# Patient Record
Sex: FEMALE | Race: WHITE | NOT HISPANIC OR LATINO | Employment: UNEMPLOYED | URBAN - NONMETROPOLITAN AREA
[De-identification: names, ages, dates, MRNs, and addresses within clinical notes are randomized per-mention and may not be internally consistent; named-entity substitution may affect disease eponyms.]

---

## 2017-03-06 ENCOUNTER — OFFICE VISIT (OUTPATIENT)
Dept: MEDICAL GROUP | Facility: PHYSICIAN GROUP | Age: 32
End: 2017-03-06
Payer: COMMERCIAL

## 2017-03-06 VITALS
OXYGEN SATURATION: 95 % | BODY MASS INDEX: 30.32 KG/M2 | HEIGHT: 65 IN | TEMPERATURE: 97.7 F | DIASTOLIC BLOOD PRESSURE: 74 MMHG | HEART RATE: 80 BPM | RESPIRATION RATE: 14 BRPM | WEIGHT: 182 LBS | SYSTOLIC BLOOD PRESSURE: 116 MMHG

## 2017-03-06 DIAGNOSIS — Z79.891 CHRONIC USE OF OPIATE DRUGS THERAPEUTIC PURPOSES: ICD-10-CM

## 2017-03-06 DIAGNOSIS — M25.551 RIGHT HIP PAIN: ICD-10-CM

## 2017-03-06 DIAGNOSIS — J02.9 SORE THROAT: ICD-10-CM

## 2017-03-06 DIAGNOSIS — F43.29 STRESS AND ADJUSTMENT REACTION: ICD-10-CM

## 2017-03-06 DIAGNOSIS — E66.9 OBESITY (BMI 35.0-39.9 WITHOUT COMORBIDITY): ICD-10-CM

## 2017-03-06 DIAGNOSIS — M25.559 ARTHRALGIA OF HIP, UNSPECIFIED LATERALITY: ICD-10-CM

## 2017-03-06 DIAGNOSIS — R06.83 SNORING: ICD-10-CM

## 2017-03-06 PROCEDURE — 99214 OFFICE O/P EST MOD 30 MIN: CPT | Performed by: NURSE PRACTITIONER

## 2017-03-06 RX ORDER — HYDROCODONE BITARTRATE AND ACETAMINOPHEN 7.5; 325 MG/1; MG/1
1 TABLET ORAL EVERY 6 HOURS
Qty: 90 TAB | Refills: 0 | Status: SHIPPED | OUTPATIENT
Start: 2017-03-06 | End: 2017-03-06 | Stop reason: SDUPTHER

## 2017-03-06 RX ORDER — HYDROCODONE BITARTRATE AND ACETAMINOPHEN 7.5; 325 MG/1; MG/1
1 TABLET ORAL EVERY 6 HOURS
Qty: 90 TAB | Refills: 0 | Status: SHIPPED | OUTPATIENT
Start: 2017-03-06 | End: 2017-05-30 | Stop reason: SDUPTHER

## 2017-03-06 ASSESSMENT — ENCOUNTER SYMPTOMS: DEPRESSION: 1

## 2017-03-06 ASSESSMENT — LIFESTYLE VARIABLES: HISTORY_ALCOHOL_USE: 0

## 2017-03-06 NOTE — MR AVS SNAPSHOT
"        Joyce Cintron   3/6/2017 4:40 PM   Office Visit   MRN: [id number]    Department:  [facility]   Dept Phone:  [phone]    Description:  Female : 1985   Provider:  ISMA Flor           Reason for Visit     Referral Needed sleep study / med refill      Allergies as of 3/6/2017     No Known Allergies      You were diagnosed with     Snoring   [485798]       Sore throat   [622027]       Arthralgia of hip, unspecified laterality   [863396]       Obesity (BMI 35.0-39.9 without comorbidity) ([facility])   [760376]       Right hip pain   [312052]       Chronic use of opiate drugs therapeutic purposes   [8164557]         Vital Signs     Blood Pressure Pulse Temperature Respirations Height Weight    116/74 mmHg 80 36.5 °C (97.7 °F) 14 1.651 m (5' 5\") 82.555 kg (182 lb)    Body Mass Index Oxygen Saturation Smoking Status             30.29 kg/m2 95% Never Smoker          Basic Information     Date Of Birth Sex Race Ethnicity Preferred Language    1985 Female White Non- English      Your appointments     Mar 24, 2017  4:00 PM   Established Patient with TOBIAS Spencer   [facility]    [facility] [facility] [address] [phone]           You will be receiving a confirmation call a few days before your appointment from our automated call confirmation system.              Problem List              ICD-10-CM Priority Class Noted - Resolved    Stress and adjustment reaction F43.29   3/18/2015 - Present    Vitamin D deficiency E55.9   3/18/2015 - Present    Hip pain M25.559   3/18/2015 - Present    Lazy eye H53.009   2015 - Present    Insomnia G47.00   2015 - Present    Hair loss L65.9   2015 - Present    Family history of MI (myocardial infarction) Z82.49   2016 - Present    Snoring R06.83   3/6/2017 - Present    Sore throat J02.9   3/6/2017 - Present    Obesity (BMI 35.0-39.9 without comorbidity) (HCC) E66.9   " 3/6/2017 - Present    Chronic use of opiate drugs therapeutic purposes Z79.899   3/6/2017 - Present      Health Maintenance        Date Due Completion Dates    IMM DTaP/Tdap/Td Vaccine (1 - Tdap) 1/3/2003 ---    PAP SMEAR 1/3/2005 ---    IMM INFLUENZA (1) 9/1/2016 ---            Current Immunizations     No immunizations on file.      Below and/or attached are the medications your provider expects you to take. Review all of your home medications and newly ordered medications with your provider and/or pharmacist. Follow medication instructions as directed by your provider and/or pharmacist. Please keep your medication list with you and share with your provider. Update the information when medications are discontinued, doses are changed, or new medications (including over-the-counter products) are added; and carry medication information at all times in the event of emergency situations     Allergies:  No Known Allergies          Medications  Valid as of: March 06, 2017 -  5:19 PM    Generic Name Brand Name Tablet Size Instructions for use    BusPIRone HCl (Tab) BUSPAR 15 MG Take 1 Tab by mouth 2 times a day.        Citalopram Hydrobromide (Tab) CELEXA 40 MG Take 1 Tab by mouth every day.        Hydrocodone-Acetaminophen (Tab) NORCO 7.5-325 MG Take 1 Tab by mouth every 6 hours.        Ibuprofen (Tab) MOTRIN 800 MG TAKE 1 TABLET BY MOUTH EVERY 8 HOURS        NON SPECIFIED   SACROILIAC BELT FOR WOMEN  DX: Chronic hip pain.        .                 Medicines prescribed today were sent to:     [facility] [id number] [place] [address] [address] [id number]  AT [facility] [address] [facility] [address]    Phone: [phone] Fax: [phone]    Open 24 Hours?: No    [facility] #[id number] - [place], [place] - [address]    [address] [place] [address]    Phone: [phone] Fax: [phone]    Open 24 Hours?: No    [facility] - [place] - [address]  AT PORTAL TO REGISTERED [facility] SITES    [address] [facility] [address]    Phone: [phone] Fax: [phone]    Open 24 Hours?: No      Medication refill instructions:       If your prescription bottle indicates you have medication refills left, it is not necessary to call your provider’s office. Please contact your pharmacy and they will refill your medication.    If your prescription bottle indicates you do not have any refills left, you may request refills at any time through one of the following ways: The online [employer] [facility] system (except Urgent Care), by calling your provider’s office, or by asking your pharmacy to contact your provider’s office with a refill request. Medication refills are processed only during regular business hours and may not be available until the next business day. Your provider may request additional information or to have a follow-up visit with you prior to refilling your medication.   *Please Note: Medication refills are assigned a new Rx number when refilled electronically. Your pharmacy may indicate that no refills were authorized even though a new prescription for the same medication is available at the pharmacy. Please request the medicine by name with the pharmacy before contacting your provider for a refill.        Instructions    To wean off Buspar--start taking once a day for 2 weeks, if ok take every other day for 2 weeks, then every 2 days for 2 weeks then stop    For Celexa, if feeling ok off the Buspar--start taking every other day for 2 weeks then every 2 days for 2 weeks then stop--If weaning straight from the 40s is too much, let me know and I will call in a script for 20 mg pill for weaning       Follow up with Cydney in 4 months    For pain management follow up, need to be seen last week of May       Other Notes About Your Plan     Urine drug screen need repeat to identify medication. She is a new start benzodiazepine present. No longer taking.           [facility]  Access Code: Activation code not generated  Current MyChart Status: Active

## 2017-03-07 NOTE — PATIENT INSTRUCTIONS
To wean off Buspar--start taking once a day for 2 weeks, if ok take every other day for 2 weeks, then every 2 days for 2 weeks then stop    For Celexa, if feeling ok off the Buspar--start taking every other day for 2 weeks then every 2 days for 2 weeks then stop--If weaning straight from the 40s is too much, let me know and I will call in a script for 20 mg pill for weaning       Follow up with Cydney in 4 months    For pain management follow up, need to be seen last week of May

## 2017-03-07 NOTE — ASSESSMENT & PLAN NOTE
"New onset, started this am. There are no other associated symptoms. Reports that she tends to get strep throat. Denies fever, but states she does not get fever when she gets strep throat. Nobody else in the household has strep. She feels that its \"moving\" down into her chest. Rapid strep test was done in office, this was negative. I did discuss with her that because her symptoms just started today, and lack of any other associated symptoms including significantly swollen tonsils, exudate, fever we will monitor for now if she is to worsen over the next few days she is to come back for further evaluation. I encouraged her to use Tylenol and/or Motrin for pain and/or fever. She is to increase her fluids and rest when she is able.  "

## 2017-03-07 NOTE — PROGRESS NOTES
"Chief Complaint   Patient presents with   • Referral Needed     sleep study / med refill         This is a 32 y.o.female patient that presents today with the following: Pain management and medication refill, multiple acute and chronic conditions, referral for sleep study    Snoring  Chronic in nature, at least 10 years. Reports that she excessive daytime sleepiness and her  tells her that she snores excessively and witness apneic episodes. She also says that she has woken herself up and feels like she is choking. She is requesting a sleep study, which I feel is completely reasonable. She can easily sit down on the couch and fall asleep. Sleep study has been ordered.     Sore throat  New onset, started this am. There are no other associated symptoms. Reports that she tends to get strep throat. Denies fever, but states she does not get fever when she gets strep throat. Nobody else in the household has strep. She feels that its \"moving\" down into her chest. Rapid strep test was done in office, this was negative. I did discuss with her that because her symptoms just started today, and lack of any other associated symptoms including significantly swollen tonsils, exudate, fever we will monitor for now if she is to worsen over the next few days she is to come back for further evaluation. I encouraged her to use Tylenol and/or Motrin for pain and/or fever. She is to increase her fluids and rest when she is able.    Hip pain  Chronic pain recheck: Pt here for pain management and medication refill. She has chronic hip and SI joint pain on the left side for which she takes hydrocodone 7.5-325 one pill 3-4 times a day, mostly 3 times a day though. Imaging is up-to-date. Opioid was told was done today, she scored a 2, thus she is appropriate for pain management and medication refills within primary care. We did go over risks, benefits and side effects of medication and she understands that these medications are addicting " and that there is no good evidence that this medication is beneficial for her pain. We did discuss her keeping her medications today and locked up if necessary. She understands that she is to get her medications only from myself for other providers in this practice and she is to use only one pharmacy. Her contract and UDS is up-to-date and they have been reviewed. [place]  has also been reviewed and she appears to be taking medications appropriately. She was also reminded that there will be no early refills and lost or stolen prescriptions will not be replaced.  Last dose of controlled substance: December 2016  Chronic pain treated with Hydrocodone 7.5-325 mg taken 3-4 times a day    She  reports that she does not drink alcohol.  She  reports that she does not use illicit drugs.    Consequences of Chronic Opiate therapy:  (5 A's)  Analgesia: Compared to no treatment or prior treatment, pain is currently improved  Activity: significantly improved  Adverse Events: She denies itchy skin  Aberrant Behaviors: She reports she is not taking medication as prescribed and is not veering from agreed treatment regimen. There have been no inappropriate refills or lost/stolen meds reported.   Affect/Mood: Pain is impacting patient's mood.  Patient denies depression/anxiety.    Nonnarcotic treatments that are being used: heating pain, ibuprofen, exerising, trying to lose weight.   Treatment goals discussed.    Last order of CONTROLLED SUBSTANCE TREATMENT AGREEMENT was found on 6/9/2016 from Office Visit on 6/9/2016     UDS Summary     Patient has no health maintenance due at this time        Most recent UDS reviewed today and is consistent with prescribed medications.    I have reviewed the medical records, the Prescription Monitoring Program and I have determined that controlled substance treatment is medically indicated.          Chronic use of opiate drugs therapeutic purposes  See additional notes    Stress and adjustment  reaction  This is a chronic condition, very well controlled. She tells me that she has discussed with her past provider weaning off of the BuSpar and citalopram and feels she is ready to do this. She feels that her anxiety and stress has resolved, in fact she feels that she was in remission from this about 6-9 months after medication was started about 2 years ago. She is ready to come off the medication now. I did discuss with her a weaning schedule for both of her medications--one medication at a time. She does deny suicidal and homicidal ideations, racing thoughts, and flight of ideas. I would like her to wean slowly off of one medication at a time and follow-up with her PCP in about 4 months. With regards to her citalopram, if she feels she cannot wean off of the 40 mg pills, she is to call and request 20 mg pills. Patient was agreeable to this plan.    Obesity (BMI 35.0-39.9 without comorbidity) (HCC)  Chronic in nature, uncontrolled. Patient's weight today is 182, BMI 30.29. We discussed increasing physical activity, making healthier food choices. She reports she will work on this and we will reassess at her next follow-up appointment. She understands the risks of obesity imposes on her health.      No visits with results within 1 Month(s) from this visit.  Latest known visit with results is:    Office Visit on 04/29/2016   Component Date Value   • Rapid Strep Screen 04/29/2016 positive    • Internal Control Positive 04/29/2016 Positive    • Internal Control Negative 04/29/2016 Negative          clinical course has been stable    Past Medical History   Diagnosis Date   • Disc disorder    • Anesthesia      PONV   • Back pain    • Pain 12/15/2015     left hip and buttock   • Depression        Past Surgical History   Procedure Laterality Date   • Dental extraction(s)  2003     wisdom teeth   • Hip arthroscopy Left 12/17/2015     Procedure: HIP ARTHROSCOPY bursectomy, gluteal eval, osteoplasty, labral debridement,  "chondroplasty, femoral neck decompression, IT band release;  Surgeon: Obdulio Frankel M.D.;  Location: SURGERY [facility];  Service:        Family History   Problem Relation Age of Onset   • Heart Disease Mother    • Heart Disease Father    • Heart Disease Paternal Grandmother    • Heart Disease Paternal Grandfather    • Diabetes     • Hypertension         Review of patient's allergies indicates no known allergies.    Current Outpatient Prescriptions Ordered in [facility]   Medication Sig Dispense Refill   • hydrocodone-acetaminophen (NORCO) 7.5-325 MG per tablet Take 1 Tab by mouth every 6 hours. 90 Tab 0   • busPIRone (BUSPAR) 15 MG tablet Take 1 Tab by mouth 2 times a day. 180 Tab 3   • citalopram (CELEXA) 40 MG Tab Take 1 Tab by mouth every day. 90 Tab 3   • NON SPECIFIED SACROILIAC BELT FOR WOMEN  DX: Chronic hip pain. 1 Each 0   • ibuprofen (MOTRIN) 800 MG Tab TAKE 1 TABLET BY MOUTH EVERY 8 HOURS 90 Tab 0     No current Epic-ordered facility-administered medications on file.       Constitutional ROS: No unexpected change in weight, No weakness, No unexplained fevers, sweats, or chills  Neck ROS: No lumps or masses, No swollen glands, No significant pain in neck  Pulmonary ROS: Positive for sore throat  Cardiovascular ROS: No chest pain, No edema, No palpitations  Gastrointestinal ROS: No abdominal pain, No nausea, vomiting, diarrhea, or constipation, no blood in stool  Musculoskeletal/Extremities ROS: Positive for chronic right hip pain, per history of present illness  Neurologic ROS: Normal development, No seizures, No weakness  Psychiatric ROS: Positive per history of present illness    Physical exam:  /74 mmHg  Pulse 80  Temp(Src) 36.5 °C (97.7 °F)  Resp 14  Ht 1.651 m (5' 5\")  Wt 82.555 kg (182 lb)  BMI 30.29 kg/m2  SpO2 95%  General Appearance: Young female, alert, no distress, mildly obese, well-groomed  Skin: Skin color, texture, turgor normal. No rashes or lesions.  Eyes: " conjunctivae/corneas clear. PERRL, OD--lazy eye  Ears: External ears normal. Canals clear. TM's normal.  Oropharynx: positive findings: mild oropharyngeal erythema  Neck: Neck supple. No adenopathy. Thyroid symmetric, normal size, and without nodularity  Lungs: negative findings: normal respiratory rate and rhythm, lungs clear to auscultation  Heart: negative. RRR without murmur, gallop, or rubs.  No ectopy.  Abdomen: Abdomen soft, non-tender. BS normal. No masses,  No organomegaly  Musculoskeletal: positive findings: Decreased range of motion to left hip due to pain  Neurologic: intact, oriented, mood appropriate, judgment intact. Cranial nerves II through XII grossly intact    Medical decision making/discussion: Patient here for pain management and medication refills. Lexiiminnie , UDS and contract reviewed, will be due for update in June 2017. She was reminded of parameters of contract as well as keeping her medications safe and locked up if necessary. 3 separate prescriptions were given to her to take to pharmacy as soon as possible. She was reminded that there will be no early refills and lost and or stolen prescriptions will not be replaced. She is to follow-up in 3 months, at least during the last week of May. We will start a weaning process for the BuSpar and Celexa per her request and she is to follow-up with her PCP in 4 months. I placed a referral for a sleep study. She is to follow-up in the next couple weeks if her sore throat does not improve with supportive care. She is to work on lifestyle modifications to improve her weight including healthy diet and increased physical activity.    Joyce was seen today for referral needed.    Diagnoses and all orders for this visit:    Snoring  -     REFERRAL TO SLEEP STUDIES    Sore throat    Arthralgia of hip, unspecified laterality    Obesity (BMI 35.0-39.9 without comorbidity) ([facility])  -     Patient identified as having weight management issue.  Appropriate  orders and counseling given.    Right hip pain  -     Discontinue: hydrocodone-acetaminophen (NORCO) 7.5-325 MG per tablet; Take 1 Tab by mouth every 6 hours.  -     Discontinue: hydrocodone-acetaminophen (NORCO) 7.5-325 MG per tablet; Take 1 Tab by mouth every 6 hours.  -     hydrocodone-acetaminophen (NORCO) 7.5-325 MG per tablet; Take 1 Tab by mouth every 6 hours.    Chronic use of opiate drugs therapeutic purposes    Stress and adjustment reaction          Please note that this dictation was created using voice recognition software. I have made every reasonable attempt to correct obvious errors, but I expect that there are errors of grammar and possibly content that I did not discover before finalizing the note.

## 2017-03-07 NOTE — ASSESSMENT & PLAN NOTE
This is a chronic condition, very well controlled. She tells me that she has discussed with her past provider weaning off of the BuSpar and citalopram and feels she is ready to do this. She feels that her anxiety and stress has resolved, in fact she feels that she was in remission from this about 6-9 months after medication was started about 2 years ago. She is ready to come off the medication now. I did discuss with her a weaning schedule for both of her medications--one medication at a time. She does deny suicidal and homicidal ideations, racing thoughts, and flight of ideas. I would like her to wean slowly off of one medication at a time and follow-up with her PCP in about 4 months. With regards to her citalopram, if she feels she cannot wean off of the 40 mg pills, she is to call and request 20 mg pills. Patient was agreeable to this plan.

## 2017-03-07 NOTE — ASSESSMENT & PLAN NOTE
Chronic in nature, at least 10 years. Reports that she excessive daytime sleepiness and her  tells her that she snores excessively and witness apneic episodes. She also says that she has woken herself up and feels like she is choking. She is requesting a sleep study, which I feel is completely reasonable. She can easily sit down on the couch and fall asleep. Sleep study has been ordered.

## 2017-03-07 NOTE — ASSESSMENT & PLAN NOTE
Chronic in nature, uncontrolled. Patient's weight today is 182, BMI 30.29. We discussed increasing physical activity, making healthier food choices. She reports she will work on this and we will reassess at her next follow-up appointment. She understands the risks of obesity imposes on her health.

## 2017-03-07 NOTE — ASSESSMENT & PLAN NOTE
Chronic pain recheck: Pt here for pain management and medication refill. She has chronic hip and SI joint pain on the left side for which she takes hydrocodone 7.5-325 one pill 3-4 times a day, mostly 3 times a day though. 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  Imaging is up-to-date. ORT was done today, she scored a 2, thus she is appropriate for pain management and medication refills within primary care. We did go over risks, benefits and side effects of medication and she understands that these medications are addicting and that there is no good evidence that this medication is beneficial for her pain. We did discuss her keeping her medications today and locked up if necessary. She understands that she is to get her medications only from myself for other providers in this practice and she is to use only one pharmacy. Her contract and UDS is up-to-date and they have been reviewed. [place]  has also been reviewed and she appears to be taking medications appropriately. She was also reminded that there will be no early refills and lost or stolen prescriptions will not be replaced.  Last dose of controlled substance: December 2016  Chronic pain treated with Hydrocodone 7.5-325 mg taken 3-4 times a day    She  reports that she does not drink alcohol.  She  reports that she does not use illicit drugs.    Consequences of Chronic Opiate therapy:  (5 A's)  Analgesia: Compared to no treatment or prior treatment, pain is currently improved  Activity: significantly improved  Adverse Events: She denies itchy skin  Aberrant Behaviors: She reports she is not taking medication as prescribed and is not veering from agreed treatment regimen. There have been no inappropriate refills or lost/stolen meds reported.   Affect/Mood: Pain is impacting patient's mood.  Patient denies depression/anxiety.    Nonnarcotic treatments that are being used: heating pain,  ibuprofen, exerising, trying to lose weight.   Treatment goals discussed.    Last order of CONTROLLED SUBSTANCE TREATMENT AGREEMENT was found on 6/9/2016 from Office Visit on 6/9/2016     UDS Summary     Patient has no health maintenance due at this time        Most recent UDS reviewed today and is consistent with prescribed medications.    I have reviewed the medical records, the Prescription Monitoring Program and I have determined that controlled substance treatment is medically indicated.

## 2017-05-14 ENCOUNTER — OFFICE VISIT (OUTPATIENT)
Dept: URGENT CARE | Facility: PHYSICIAN GROUP | Age: 32
End: 2017-05-14
Payer: COMMERCIAL

## 2017-05-14 VITALS
DIASTOLIC BLOOD PRESSURE: 78 MMHG | SYSTOLIC BLOOD PRESSURE: 118 MMHG | HEART RATE: 72 BPM | BODY MASS INDEX: 31.49 KG/M2 | HEIGHT: 65 IN | WEIGHT: 189 LBS | OXYGEN SATURATION: 99 % | TEMPERATURE: 98.6 F | RESPIRATION RATE: 14 BRPM

## 2017-05-14 DIAGNOSIS — J04.0 LARYNGITIS: ICD-10-CM

## 2017-05-14 DIAGNOSIS — J02.9 VIRAL PHARYNGITIS: ICD-10-CM

## 2017-05-14 LAB
INT CON NEG: NEGATIVE
INT CON POS: POSITIVE
S PYO AG THROAT QL: NORMAL

## 2017-05-14 PROCEDURE — 99214 OFFICE O/P EST MOD 30 MIN: CPT | Performed by: PHYSICIAN ASSISTANT

## 2017-05-14 PROCEDURE — 87880 STREP A ASSAY W/OPTIC: CPT | Performed by: PHYSICIAN ASSISTANT

## 2017-05-14 RX ORDER — DIPHENHYDRAMINE HYDROCHLORIDE AND LIDOCAINE HYDROCHLORIDE AND ALUMINUM HYDROXIDE AND MAGNESIUM HYDRO
5 KIT EVERY 6 HOURS PRN
Qty: 100 ML | Refills: 0 | Status: SHIPPED | OUTPATIENT
Start: 2017-05-14 | End: 2017-05-30

## 2017-05-14 NOTE — MR AVS SNAPSHOT
"        Joyce Cintron   2017 10:55 AM   Office Visit   MRN: [id number]    Department:  [facility]   Dept Phone:  [phone]    Description:  Female : 1985   Provider:  Yesica Pryor PA-C           Reason for Visit     Pharyngitis           Allergies as of 2017     No Known Allergies      You were diagnosed with     Sore throat   [451137]       Laryngitis   [397865]         Vital Signs     Blood Pressure Pulse Temperature Respirations Height Weight    118/78 mmHg 72 37 °C (98.6 °F) 14 1.651 m (5' 5\") 85.73 kg (189 lb)    Body Mass Index Oxygen Saturation Smoking Status             31.45 kg/m2 99% Never Smoker          Basic Information     Date Of Birth Sex Race Ethnicity Preferred Language    1985 Female White Non- English      Your appointments     May 23, 2017  3:00 PM   Established Patient with TOBIAS Spencer   [address] [address] [phone]           You will be receiving a confirmation call a few days before your appointment from our automated call confirmation system.            2017 10:40 AM   Telemedicine Clinic New Patient with A Rotation, [facility], [facility] [place]   Centralized Scheduling (--)    [address]  [place] [id number] [phone]            2017  1:20 PM   Established Patient with Lana Chase M.D.   [facility]    [address] [place] [address] [phone]           You will be receiving a confirmation call a few days before your appointment from our automated call confirmation system.              Problem List              ICD-10-CM Priority Class Noted - Resolved    Stress and adjustment reaction F43.29   3/18/2015 - Present    Vitamin D deficiency E55.9   3/18/2015 - Present    Hip pain M25.559   3/18/2015 - Present    Lazy eye H53.009   2015 - Present   " Insomnia G47.00   8/20/2015 - Present    Hair loss L65.9   8/20/2015 - Present    Family history of MI (myocardial infarction) Z82.49   9/23/2016 - Present    Snoring R06.83   3/6/2017 - Present    Sore throat J02.9   3/6/2017 - Present    Obesity (BMI 35.0-39.9 without comorbidity) (HCC) E66.9   3/6/2017 - Present    Chronic use of opiate drugs therapeutic purposes Z79.891   3/6/2017 - Present      Health Maintenance        Date Due Completion Dates    IMM DTaP/Tdap/Td Vaccine (1 - Tdap) 1/3/2003 ---    PAP SMEAR 1/3/2005 ---            Results     POCT Rapid Strep A      Component    Rapid Strep Screen    neg    Internal Control Positive    Positive    Internal Control Negative    Negative                        Current Immunizations     No immunizations on file.      Below and/or attached are the medications your provider expects you to take. Review all of your home medications and newly ordered medications with your provider and/or pharmacist. Follow medication instructions as directed by your provider and/or pharmacist. Please keep your medication list with you and share with your provider. Update the information when medications are discontinued, doses are changed, or new medications (including over-the-counter products) are added; and carry medication information at all times in the event of emergency situations     Allergies:  No Known Allergies          Medications  Valid as of: May 14, 2017 - 11:50 AM    Generic Name Brand Name Tablet Size Instructions for use    BusPIRone HCl (Tab) BUSPAR 15 MG Take 1 Tab by mouth 2 times a day.        Citalopram Hydrobromide (Tab) CELEXA 40 MG Take 1 Tab by mouth every day.        DPH-Lido-AlHydr-MgHydr-Simeth (Suspension) MAGIC MOUTHWASH BLM  Take 5 mL by mouth every 6 hours as needed.        Hydrocodone-Acetaminophen (Tab) NORCO 7.5-325 MG Take 1 Tab by mouth every 6 hours.        Ibuprofen (Tab) MOTRIN 800 MG TAKE 1 TABLET BY MOUTH EVERY 8 HOURS        .                  Medicines prescribed today were sent to:     [id number] - [place], [place] - [address] [facility] [address]    [address] [place] [address]    Phone: [phone] Fax: [phone]    Open 24 Hours?: No    [id number] - [place], [place] - [address]    [address] [place] [address]    Phone: [phone] Fax: [phone]    Open 24 Hours?: No    [facility] - [place], [place] - [address] AT PORTAL TO REGISTERED [facility] SITES    [address] [facility] [address]    Phone: [phone] Fax: [phone]    Open 24 Hours?: No      Medication refill instructions:       If your prescription bottle indicates you have medication refills left, it is not necessary to call your provider’s office. Please contact your pharmacy and they will refill your medication.    If your prescription bottle indicates you do not have any refills left, you may request refills at any time through one of the following ways: The online [employer] system (except Urgent Care), by calling your provider’s office, or by asking your pharmacy to contact your provider’s office with a refill request. Medication refills are processed only during regular business hours and may not be available until the next business day. Your provider may request additional information or to have a follow-up visit with you prior to refilling your medication.   *Please Note: Medication refills are assigned a new Rx number when refilled electronically. Your pharmacy may indicate that no refills were authorized even though a new prescription for the same medication is available at the pharmacy. Please request the medicine by name with the pharmacy before contacting your provider for a refill.        Other Notes About Your Plan     Urine drug screen need repeat to identify medication. She is a new start benzodiazepine present. No longer taking.           [employer] Access Code:  Activation code not generated  Current MyChart Status: Active

## 2017-05-15 NOTE — TELEPHONE ENCOUNTER
Was the patient seen in the last year in this department? Yes     Does patient have an active prescription for medications requested? No     Received Request Via: Pharmacy      Pt met protocol?: Yes pt last ov 9/2016

## 2017-05-15 NOTE — TELEPHONE ENCOUNTER
Cydney- You discussed with pt at last OV 3/6/2017 weaning off of this medication. Not exactly where pt is at on this schedule or if pt needs to be reduced to 20mg. Please advise.

## 2017-05-16 RX ORDER — CITALOPRAM 40 MG/1
TABLET ORAL
Qty: 90 TAB | Refills: 0 | Status: SHIPPED | OUTPATIENT
Start: 2017-05-16 | End: 2017-05-30

## 2017-05-16 NOTE — TELEPHONE ENCOUNTER
Please call patient. I refilled her RX, but wondered if she ever decided to wean off the medication?  Cydney Garcia

## 2017-05-18 ASSESSMENT — ENCOUNTER SYMPTOMS
SWOLLEN GLANDS: 0
SHORTNESS OF BREATH: 0
VOMITING: 0
FEVER: 0
NAUSEA: 0
DIARRHEA: 0
HEADACHES: 0
SORE THROAT: 1
DIZZINESS: 0
TROUBLE SWALLOWING: 1
CHILLS: 0
MUSCULOSKELETAL NEGATIVE: 1
ABDOMINAL PAIN: 0

## 2017-05-18 NOTE — PROGRESS NOTES
"Subjective:      Joyce Cintron is a 32 y.o. female who presents with Pharyngitis            Pharyngitis   This is a new problem. The current episode started in the past 7 days. The problem has been unchanged. Neither side of throat is experiencing more pain than the other. There has been no fever. The pain is at a severity of 3/10. Associated symptoms include congestion and trouble swallowing. Pertinent negatives include no abdominal pain, diarrhea, ear pain, headaches, shortness of breath, swollen glands or vomiting. She has had no exposure to strep or mono. She has tried nothing for the symptoms.       Review of Systems   Constitutional: Negative for fever and chills.   HENT: Positive for congestion, sore throat and trouble swallowing. Negative for ear pain.         Positive for loss of voice     Respiratory: Negative for shortness of breath.    Cardiovascular: Negative for chest pain.   Gastrointestinal: Negative for nausea, vomiting, abdominal pain and diarrhea.   Genitourinary: Negative.    Musculoskeletal: Negative.    Neurological: Negative for dizziness and headaches.          Objective:     /78 mmHg  Pulse 72  Temp(Src) 37 °C (98.6 °F)  Resp 14  Ht 1.651 m (5' 5\")  Wt 85.73 kg (189 lb)  BMI 31.45 kg/m2  SpO2 99%     Physical Exam   Constitutional: She is oriented to person, place, and time. She appears well-developed and well-nourished. No distress.   HENT:   Head: Normocephalic and atraumatic.   Right Ear: Hearing, tympanic membrane, external ear and ear canal normal.   Left Ear: Hearing, tympanic membrane, external ear and ear canal normal.   Nose: Nose normal.   Mouth/Throat: No oropharyngeal exudate.   Posterior oropharynx mildly erythematous without exudate or enlarged tonsils.    Eyes: Conjunctivae are normal. Pupils are equal, round, and reactive to light. Right eye exhibits no discharge. Left eye exhibits no discharge.   Neck: Normal range of motion.   Cardiovascular: Normal rate, " regular rhythm and normal heart sounds.  Exam reveals no friction rub.    No murmur heard.  Pulmonary/Chest: Effort normal. No respiratory distress. She has no wheezes. She has no rales.   Musculoskeletal: Normal range of motion.   Neurological: She is alert and oriented to person, place, and time.   Skin: Skin is warm and dry. She is not diaphoretic.   Psychiatric: She has a normal mood and affect. Her behavior is normal.   Nursing note and vitals reviewed.              PMH:  has a past medical history of Disc disorder; Anesthesia; Back pain; Pain (12/15/2015); and Depression.  MEDS:   Current outpatient prescriptions:   •  DPH-Lido-AlHydr-MgHydr-Simeth (MAGIC MOUTHWASH BLM) Suspension, Take 5 mL by mouth every 6 hours as needed., Disp: 100 mL, Rfl: 0  •  hydrocodone-acetaminophen (NORCO) 7.5-325 MG per tablet, Take 1 Tab by mouth every 6 hours., Disp: 90 Tab, Rfl: 0  •  citalopram (CELEXA) 40 MG Tab, TAKE 1 TABLET DAILY, Disp: 90 Tab, Rfl: 0  •  busPIRone (BUSPAR) 15 MG tablet, Take 1 Tab by mouth 2 times a day., Disp: 180 Tab, Rfl: 3  •  ibuprofen (MOTRIN) 800 MG Tab, TAKE 1 TABLET BY MOUTH EVERY 8 HOURS, Disp: 90 Tab, Rfl: 0  ALLERGIES: No Known Allergies  SURGHX:   Past Surgical History   Procedure Laterality Date   • Dental extraction(s)  2003     wisdom teeth   • Hip arthroscopy Left 12/17/2015     Procedure: HIP ARTHROSCOPY bursectomy, gluteal eval, osteoplasty, labral debridement, chondroplasty, femoral neck decompression, IT band release;  Surgeon: Obdulio Frankel M.D.;  Location: SURGERY [facility];  Service:      SOCHX:  reports that she has never smoked. She has never used smokeless tobacco. She reports that she does not drink alcohol or use illicit drugs.  FH: family history includes Diabetes in an other family member; Heart Disease in her father, mother, paternal grandfather, and paternal grandmother; Hypertension in an other family member.    Assessment/Plan:     1. Viral pharyngitis  -  POCT Rapid Strep A: NEGATIVE  - DPH-Lido-AlHydr-MgHydr-Simeth (MAGIC MOUTHWASH BLM) Suspension; Take 5 mL by mouth every 6 hours as needed.  Dispense: 100 mL; Refill: 0    2. Laryngitis  - Advised patient her symptoms are most likely viral in etiology, recommend supportive care  - Increased fluids and rest  - Encouraged use of humidifier, voice rest, and warm fluids    Call or return to office if symptoms persist or worsen. The patient demonstrated a good understanding and agreed with the treatment plan.

## 2017-05-30 ENCOUNTER — OFFICE VISIT (OUTPATIENT)
Dept: MEDICAL GROUP | Facility: PHYSICIAN GROUP | Age: 32
End: 2017-05-30
Payer: COMMERCIAL

## 2017-05-30 VITALS
HEART RATE: 94 BPM | HEIGHT: 65 IN | BODY MASS INDEX: 32.65 KG/M2 | RESPIRATION RATE: 16 BRPM | WEIGHT: 196 LBS | TEMPERATURE: 99.1 F | DIASTOLIC BLOOD PRESSURE: 76 MMHG | SYSTOLIC BLOOD PRESSURE: 118 MMHG | OXYGEN SATURATION: 96 %

## 2017-05-30 DIAGNOSIS — M25.551 RIGHT HIP PAIN: ICD-10-CM

## 2017-05-30 DIAGNOSIS — Z79.891 CHRONIC USE OF OPIATE DRUGS THERAPEUTIC PURPOSES: ICD-10-CM

## 2017-05-30 PROCEDURE — 99214 OFFICE O/P EST MOD 30 MIN: CPT | Performed by: NURSE PRACTITIONER

## 2017-05-30 RX ORDER — HYDROCODONE BITARTRATE AND ACETAMINOPHEN 7.5; 325 MG/1; MG/1
1 TABLET ORAL 3 TIMES DAILY PRN
Qty: 100 TAB | Refills: 0 | Status: SHIPPED | OUTPATIENT
Start: 2017-05-30 | End: 2017-08-30 | Stop reason: SDUPTHER

## 2017-05-30 RX ORDER — HYDROCODONE BITARTRATE AND ACETAMINOPHEN 7.5; 325 MG/1; MG/1
1 TABLET ORAL 3 TIMES DAILY PRN
Qty: 100 TAB | Refills: 0 | Status: SHIPPED | OUTPATIENT
Start: 2017-05-30 | End: 2017-05-30 | Stop reason: SDUPTHER

## 2017-05-30 NOTE — MR AVS SNAPSHOT
"        Joyce Arcostra   2017 5:00 PM   Office Visit   MRN: [id number]    Department:  [facility]   Dept Phone:  [phone]    Description:  Female : 1985   Provider:  ISMA Flor           Reason for Visit     Pain fv pain med refill      Allergies as of 2017     No Known Allergies      You were diagnosed with     Right hip pain   [526360]       Chronic use of opiate drugs therapeutic purposes   [1869795]         Vital Signs     Blood Pressure Pulse Temperature Respirations Height Weight    118/76 mmHg 94 37.3 °C (99.1 °F) 16 1.651 m (5' 5\") 88.905 kg (196 lb)    Body Mass Index Oxygen Saturation Smoking Status             32.62 kg/m2 96% Never Smoker          Basic Information     Date Of Birth Sex Race Ethnicity Preferred Language    1985 Female White Non- English      Your appointments     2017 10:40 AM   Telemedicine Clinic New Patient with A Rotation, [facility], [facility]   Centralized Scheduling (--)    [address] [facility] [id number] [phone]            2017  1:20 PM   Established Patient with Lana Chase M.D.   [facility] ([address] [address] [phone]           You will be receiving a confirmation call a few days before your appointment from our automated call confirmation system.              Problem List              ICD-10-CM Priority Class Noted - Resolved    Stress and adjustment reaction F43.29   3/18/2015 - Present    Vitamin D deficiency E55.9   3/18/2015 - Present    Hip pain M25.559   3/18/2015 - Present    Lazy eye H53.009   2015 - Present    Insomnia G47.00   2015 - Present    Hair loss L65.9   2015 - Present    Family history of MI (myocardial infarction) Z82.49   2016 - Present    Snoring R06.83   3/6/2017 - Present    Sore throat J02.9   3/6/2017 - Present    Obesity (BMI 35.0-39.9 " without comorbidity) ([facility]) E66.9   3/6/2017 - Present    Chronic use of opiate drugs therapeutic purposes Z79.891   3/6/2017 - Present      Health Maintenance        Date Due Completion Dates    IMM DTaP/Tdap/Td Vaccine (1 - Tdap) 1/3/2003 ---    PAP SMEAR 1/3/2005 ---            Current Immunizations     Tdap Vaccine 3/12/2009      Below and/or attached are the medications your provider expects you to take. Review all of your home medications and newly ordered medications with your provider and/or pharmacist. Follow medication instructions as directed by your provider and/or pharmacist. Please keep your medication list with you and share with your provider. Update the information when medications are discontinued, doses are changed, or new medications (including over-the-counter products) are added; and carry medication information at all times in the event of emergency situations     Allergies:  No Known Allergies          Medications  Valid as of: May 30, 2017 -  5:20 PM    Generic Name Brand Name Tablet Size Instructions for use    Hydrocodone-Acetaminophen (Tab) NORCO 7.5-325 MG Take 1 Tab by mouth 3 times a day as needed.        Ibuprofen (Tab) MOTRIN 800 MG TAKE 1 TABLET BY MOUTH EVERY 8 HOURS        .                 Medicines prescribed today were sent to:     [facility] [address] [place] [address] [address] [place] [id number]  AT [place] [address] [facility] [address]    Phone: [phone] Fax: [phone]    Open 24 Hours?: No    [id number] - [place], [place] - [address]    [address] [place] [address]    Phone: [phone] Fax: [phone]    Open 24 Hours?: No    [facility] [place] - [address] AT PORTAL TO REGISTERED [facility] SITES    [address] [facility] [address]    Phone: [phone] Fax: [phone]    Open 24 Hours?: No      Medication refill instructions:       If your prescription bottle indicates  you have medication refills left, it is not necessary to call your provider’s office. Please contact your pharmacy and they will refill your medication.    If your prescription bottle indicates you do not have any refills left, you may request refills at any time through one of the following ways: The online [employer] system (except Urgent Care), by calling your provider’s office, or by asking your pharmacy to contact your provider’s office with a refill request. Medication refills are processed only during regular business hours and may not be available until the next business day. Your provider may request additional information or to have a follow-up visit with you prior to refilling your medication.   *Please Note: Medication refills are assigned a new Rx number when refilled electronically. Your pharmacy may indicate that no refills were authorized even though a new prescription for the same medication is available at the pharmacy. Please request the medicine by name with the pharmacy before contacting your provider for a refill.        Instructions    Follow up the last week of August for refills           Other Notes About Your Plan     Urine drug screen need repeat to identify medication. She is a new start benzodiazepine present. No longer taking.           [employer] Access Code: Activation code not generated  Current [employer] Status: Active

## 2017-05-31 NOTE — ASSESSMENT & PLAN NOTE
Chronic pain recheck: here for pain management and pain medication refills. Take Norco 7.5-325 mg 1 pill up to 2-3 times a day for chronic hip pain. She does carry diagnosis of left hip femoro-acetabular impingement with cam-type bursitis and tight IT band. She will be traveling quite a bit this summer, taking several trips where she will be walking quite a bit, one trip will be overseas in [place]. Excessive walking does aggravate her pain and she is wondering if we can increase the dose for her trips. I was agreeable to increasing her dose just for the next 3 months while she travels with the expectation that she will decrease back down to her usual doses. Patient was agreeable to this plan.  I discussed with patient the risks, benefits and side effects of these medications. Patient was reminded that there would be no early refills and that lost and stolen prescriptions would not be replaced. I counseled patient on the fact that there is no clear evidence that ongoing opioid therapy is beneficial for chronic pain. Patient was instructed to keep pain medications safe and locked up if necessary. Patient was given 3 separate prescriptions for a 3 month supply to take to pharmacy right away. Patient is to take these exactly as prescribed and was advised not to drink alcohol or use illicit substances. The [place] , contract and recent UDS were reviewed and patient is taking medications appropriately.    Last dose of controlled substance: 2 days ago  Chronic pain treated with Norco 7.5-325 mg 1 pill  taken 2-3 times a day    She  reports that she does not drink alcohol.  She  reports that she does not use illicit drugs.    Consequences of Chronic Opiate therapy:  (5 A's)  Analgesia: Compared to no treatment or prior treatment, pain is currently improved  Activity: improved  Adverse Events: She denies constipation, dry mouth, itchy skin, nausea and sedation  Aberrant Behaviors: She reports she is taking medication as  prescribed and is not veering from agreed treatment regimen. There have been no inappropriate refills or lost/stolen meds reported.   Affect/Mood: Pain is not impacting patient's mood.  Patient denies depression/anxiety.    Nonnarcotic treatments that are being used: NSAIDs/HOLLINGSWORTH-2, stretching  Treatment goals discussed.    Opioid Risk Score: 2    Interpretation of Opioid Risk Score   Score 0-3 = Low risk of abuse. Do UDS at least once per year.  Score 4-7 = Moderate risk of abuse. Do UDS 1-4 times per year.  Score 8+ = High risk of abuse. Refer to specialist.    Last order of CONTROLLED SUBSTANCE TREATMENT AGREEMENT was found on 6/9/2016 from Office Visit on 6/9/2016     UDS Summary                URINE DRUG SCREEN Next Due 6/4/2017      Done 6/9/2016 URINE DRUG SCREEN        Most recent UDS reviewed today and is consistent with prescribed medications.    I have reviewed the medical records, the Prescription Monitoring Program and I have determined that controlled substance treatment is medically indicated.

## 2017-05-31 NOTE — PROGRESS NOTES
Chief Complaint   Patient presents with   • Pain     fv pain med refill         This is a 32 y.o.female patient that presents today with the following: Pain management follow-up and medication refills    Chronic use of opiate drugs therapeutic purposes  Chronic pain recheck: here for pain management and pain medication refills. Take Norco 7.5-325 mg 1 pill up to 2-3 times a day for chronic hip pain. She does carry diagnosis of left hip femoro-acetabular impingement with cam-type bursitis and tight IT band. She will be traveling quite a bit this summer, taking several trips where she will be walking quite a bit, one trip will be overseas in [place]. Excessive walking does aggravate her pain and she is wondering if we can increase the dose for her trips. I was agreeable to increasing her dose just for the next 3 months while she travels with the expectation that she will decrease back down to her usual doses. Patient was agreeable to this plan.  I discussed with patient the risks, benefits and side effects of these medications. Patient was reminded that there would be no early refills and that lost and stolen prescriptions would not be replaced. I counseled patient on the fact that there is no clear evidence that ongoing opioid therapy is beneficial for chronic pain. Patient was instructed to keep pain medications safe and locked up if necessary. Patient was given 3 separate prescriptions for a 3 month supply to take to pharmacy right away. Patient is to take these exactly as prescribed and was advised not to drink alcohol or use illicit substances. The [place] , contract and recent UDS were reviewed and patient is taking medications appropriately.    Last dose of controlled substance: 2 days ago  Chronic pain treated with Norco 7.5-325 mg 1 pill  taken 2-3 times a day    She  reports that she does not drink alcohol.  She  reports that she does not use illicit drugs.    Consequences of Chronic Opiate therapy:  (5  A's)  Analgesia: Compared to no treatment or prior treatment, pain is currently improved  Activity: improved  Adverse Events: She denies constipation, dry mouth, itchy skin, nausea and sedation  Aberrant Behaviors: She reports she is taking medication as prescribed and is not veering from agreed treatment regimen. There have been no inappropriate refills or lost/stolen meds reported.   Affect/Mood: Pain is not impacting patient's mood.  Patient denies depression/anxiety.    Nonnarcotic treatments that are being used: NSAIDs/HOLLINGSWORTH-2, stretching  Treatment goals discussed.    Opioid Risk Score: 2    Interpretation of Opioid Risk Score   Score 0-3 = Low risk of abuse. Do UDS at least once per year.  Score 4-7 = Moderate risk of abuse. Do UDS 1-4 times per year.  Score 8+ = High risk of abuse. Refer to specialist.    Last order of CONTROLLED SUBSTANCE TREATMENT AGREEMENT was found on 6/9/2016 from Office Visit on 6/9/2016     UDS Summary                URINE DRUG SCREEN Next Due 6/4/2017      Done 6/9/2016 URINE DRUG SCREEN        Most recent UDS reviewed today and is consistent with prescribed medications.    I have reviewed the medical records, the Prescription Monitoring Program and I have determined that controlled substance treatment is medically indicated.            Office Visit on 05/14/2017   Component Date Value   • Rapid Strep Screen 05/14/2017 neg    • Internal Control Positive 05/14/2017 Positive    • Internal Control Negative 05/14/2017 Negative          clinical course has been stable    Past Medical History   Diagnosis Date   • Disc disorder    • Anesthesia      PONV   • Back pain    • Pain 12/15/2015     left hip and buttock   • Depression        Past Surgical History   Procedure Laterality Date   • Dental extraction(s)  2003     wisdom teeth   • Hip arthroscopy Left 12/17/2015     Procedure: HIP ARTHROSCOPY bursectomy, gluteal eval, osteoplasty, labral debridement, chondroplasty, femoral neck  "decompression, IT band release;  Surgeon: Obdulio Frankel M.D.;  Location: SURGERY [facility];  Service:        Family History   Problem Relation Age of Onset   • Heart Disease Mother    • Heart Disease Father    • Heart Disease Paternal Grandmother    • Heart Disease Paternal Grandfather    • Diabetes     • Hypertension         Review of patient's allergies indicates no known allergies.    Current Outpatient Prescriptions Ordered in [facility]   Medication Sig Dispense Refill   • hydrocodone-acetaminophen (NORCO) 7.5-325 MG per tablet Take 1 Tab by mouth 3 times a day as needed. 100 Tab 0   • ibuprofen (MOTRIN) 800 MG Tab TAKE 1 TABLET BY MOUTH EVERY 8 HOURS 90 Tab 0     No current Epic-ordered facility-administered medications on file.       Constitutional ROS: No unexpected change in weight, No weakness, No unexplained fevers, sweats, or chills  Pulmonary ROS: No chronic cough, sputum, or hemoptysis, No shortness of breath, No recent change in breathing  Cardiovascular ROS: No chest pain, No edema, No palpitations  Gastrointestinal ROS: No abdominal pain, No nausea, vomiting, diarrhea, or constipation, no blood in stool  Musculoskeletal/Extremities ROS: Positive per history of present illness  Neurologic ROS: Normal development, No seizures, No weakness    Physical exam:  /76 mmHg  Pulse 94  Temp(Src) 37.3 °C (99.1 °F)  Resp 16  Ht 1.651 m (5' 5\")  Wt 88.905 kg (196 lb)  BMI 32.62 kg/m2  SpO2 96%  General Appearance: Young female, alert, no distress, obese, well-groomed  Skin: Skin color, texture, turgor normal. No rashes or lesions.  Lungs: negative findings: normal respiratory rate and rhythm, normal effort  Abdomen: Abdomen soft, non-tender. BS normal. No masses,  No organomegaly  Musculoskeletal: positive findings: Decreased range of motion to left hip due to pain  Neurologic: intact, oriented, mood appropriate, judgment intact. Cranial nerves II-12 grossly intact    Medical decision " making/discussion: Patient here for pain management medication refills. [place] , UDS and contract reviewed, she is taking medications appropriately. We will increase the number of pills for her next 3 prescriptions to #100, she can take 2-3 pills as needed. She was counseled on the risks benefits and side effects of medications, and reminded that there would be no early refills and that lost or stolen prescriptions would not be replaced. She is taking exactly as prescribed and keep him safe and locked up if necessary. She was advised not to drink alcohol or take any other illicit substances while on these medications. She is to follow-up in 3 months, at that time she will be due for a contract update as well as new UDS screening.    Joyce was seen today for pain.    Diagnoses and all orders for this visit:    Right hip pain    -     hydrocodone-acetaminophen (NORCO) 7.5-325 MG per tablet; Take 1 Tab by mouth 3 times a day as needed.    Chronic use of opiate drugs therapeutic purposes  -     hydrocodone-acetaminophen (NORCO) 7.5-325 MG per tablet; Take 1 Tab by mouth 3 times a day as needed.          Please note that this dictation was created using voice recognition software. I have made every reasonable attempt to correct obvious errors, but I expect that there are errors of grammar and possibly content that I did not discover before finalizing the note.

## 2017-06-12 ENCOUNTER — TELEMEDICINE2 (OUTPATIENT)
Dept: SCHEDULING | Facility: IMAGING CENTER | Age: 32
End: 2017-06-12

## 2017-06-12 VITALS
RESPIRATION RATE: 12 BRPM | WEIGHT: 189 LBS | SYSTOLIC BLOOD PRESSURE: 118 MMHG | HEIGHT: 66 IN | HEART RATE: 108 BPM | DIASTOLIC BLOOD PRESSURE: 76 MMHG | OXYGEN SATURATION: 96 % | TEMPERATURE: 97.7 F | BODY MASS INDEX: 30.37 KG/M2

## 2017-06-12 DIAGNOSIS — G47.33 OSA (OBSTRUCTIVE SLEEP APNEA): Primary | ICD-10-CM

## 2017-06-12 DIAGNOSIS — G47.00 INSOMNIA, UNSPECIFIED TYPE: ICD-10-CM

## 2017-06-12 DIAGNOSIS — E66.9 OBESITY (BMI 35.0-39.9 WITHOUT COMORBIDITY): ICD-10-CM

## 2017-06-12 PROCEDURE — 99201 PR OFFICE/OUTPT VISIT,NEW,LEVL I: CPT | Mod: GT | Performed by: INTERNAL MEDICINE

## 2017-06-12 ASSESSMENT — PAIN SCALES - GENERAL: PAINLEVEL: 2=MINIMAL-SLIGHT

## 2017-06-12 NOTE — PROGRESS NOTES
"Initial Office Visit    Referring Provider: Jeanne ARMSTRONG      Subjective:      Joyce Cintron is a 32 y.o. Female, hx of chronic pain, obesity, anxiety disorder who presents for complaints of insomnia.    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 She goes to bed around 10p, falls asleep about 11p-1230a, and at times sleeps through the night until 7am, and most of the time awakens around 3-4am and is unable to fall back asleep. Even when she  Sleeps until 7am, she feels tired, unrested, and requires about 3 cups of coffee, 1-2 coffee tablets during the course of the day. Her last cup is usually around 2pm. She exercises around 8pm. She has been on ambien in the past but no refills prescribed as she is on narcotics. She felt this would help her fall asleep but not restful. She snores, witnessed apneic events, choking events. Weight is stable at 180.   She denies fevers, chills, rashes. She has chronic hip pain, s/p surgical intervention. No nocturia.    Joyce  has a past medical history of Disc disorder; Anesthesia; Back pain; Pain (12/15/2015); and Depression.    Residential Hx-                                Pet Exposures Hx  Pet Exposures   • Denies Pet/Animal Exposures Yes        Occupational Hx-                                      Review of Systems  As per [facility]     Objective:     Filed Vitals:    06/12/17 1041   Height: 1.664 m (5' 5.5\")   Weight: 85.73 kg (189 lb)   Weight % change since last entry.: 0 %   BP: 118/76   Pulse: 108   BMI (Calculated): 30.97   Resp: 12   Temp: 36.5 °C (97.7 °F)   O2 sat % room air: 96 %       Physical Exam  This was a telemedicine visit         Assessment/Plan:   Insomnia  BABAK    Advised to eliminate caffeine. Workout earlier in the day.   elminate use of phones, TV, turn off lights. Use bedroom for intimacy and sleeping.  ESS of 15- moderate excessive daytime sleepiness  High risk for BABAK-- " Plan for polysomnography, split night  Avoid alcohol and other sedatives at bedtime  Encourage weight loss  Over 50% of the visit was used for counseling on sleep habits, sleep hygiene and concern for BABAK.  >RLS?-  Hgb, TSH WNL. Check iron studies      Jaime Milner MD  [facility]

## 2017-07-11 ENCOUNTER — OFFICE VISIT (OUTPATIENT)
Dept: MEDICAL GROUP | Facility: PHYSICIAN GROUP | Age: 32
End: 2017-07-11
Payer: COMMERCIAL

## 2017-07-11 ENCOUNTER — TELEPHONE (OUTPATIENT)
Dept: MEDICAL GROUP | Facility: PHYSICIAN GROUP | Age: 32
End: 2017-07-11

## 2017-07-11 VITALS
HEIGHT: 66 IN | WEIGHT: 178 LBS | DIASTOLIC BLOOD PRESSURE: 72 MMHG | HEART RATE: 74 BPM | RESPIRATION RATE: 16 BRPM | BODY MASS INDEX: 28.61 KG/M2 | OXYGEN SATURATION: 97 % | TEMPERATURE: 98.4 F | SYSTOLIC BLOOD PRESSURE: 118 MMHG

## 2017-07-11 DIAGNOSIS — R06.83 SNORING: ICD-10-CM

## 2017-07-11 DIAGNOSIS — E78.5 DYSLIPIDEMIA: ICD-10-CM

## 2017-07-11 DIAGNOSIS — M25.559 ARTHRALGIA OF HIP, UNSPECIFIED LATERALITY: ICD-10-CM

## 2017-07-11 PROCEDURE — 99214 OFFICE O/P EST MOD 30 MIN: CPT | Performed by: FAMILY MEDICINE

## 2017-07-11 NOTE — ASSESSMENT & PLAN NOTE
Chronic hip and low back pain on the left. Located above buttock on left side of spine with pain radiating down the side of her leg to above her knee and at times pain down the front of her leg.   No weakness, and after surgery the leg is not as strong as it was before. She has completed 3 rounds of PT.   She has had surgery for bursitis with Dr. Frankel with [facility] orthopedics. She was referred out to an SI joint specialist and has not gone yet because she feels a little hopeless about it and is tired of getting injections and surgeries.   She has tried gabapentin which did not help and made her hair fall out.   She controls the pain with ibuprofen 800 mg bid and norco 7.5/325 which she uses once or twice a day.  She would like to revisit the interventions on the hip and back after sleep study.   She does not use alcohol or illegal drugs or benzodiazepines.   She does not need any refills today.

## 2017-07-11 NOTE — MR AVS SNAPSHOT
"        Joyce Cintron   2017 1:20 PM   Office Visit   MRN: [id number]    Department:  [facility]   Dept Phone:  [phone]    Description:  Female : 1985   Provider:  Lana Chase M.D.           Reason for Visit     Medication Management celexa / buspar weaned off      Allergies as of 2017     No Known Allergies      You were diagnosed with     Arthralgia of hip, unspecified laterality   [867715]       Dyslipidemia   [818514]       Snoring   [830637]         Vital Signs     Blood Pressure Pulse Temperature Respirations Height Weight    118/72 mmHg 74 36.9 °C (98.4 °F) 16 1.664 m (5' 5.51\") 80.74 kg (178 lb)    Body Mass Index Oxygen Saturation Smoking Status             29.16 kg/m2 97% Never Smoker          Basic Information     Date Of Birth Sex Race Ethnicity Preferred Language    1985 Female White Non- English      Your appointments     Aug 21, 2017  9:05 PM   Sleep Study Diagnostic with SLEEP TECH   [facility] (--)    [address]  [place] [id number] [phone]            Aug 25, 2017 11:20 AM   Follow UP with ISMA Rojas   [facility] (--)    [address]  [place] [id number] [phone]            Aug 30, 2017  4:40 PM   Established Patient with TOBIAS Spencer   [address] [address] [phone]           You will be receiving a confirmation call a few days before your appointment from our automated call confirmation system.              Problem List              ICD-10-CM Priority Class Noted - Resolved    Stress and adjustment reaction F43.29   3/18/2015 - Present    Vitamin D deficiency E55.9   3/18/2015 - Present    Hip pain M25.559   3/18/2015 - Present    Lazy eye H53.009   2015 - Present    Insomnia G47.00   2015 - Present    Hair loss L65.9   2015 - Present   " Family history of MI (myocardial infarction) Z82.49   9/23/2016 - Present    Snoring R06.83   3/6/2017 - Present    Sore throat J02.9   3/6/2017 - Present    Obesity (BMI 35.0-39.9 without comorbidity) (HCC) E66.9   3/6/2017 - Present    Chronic use of opiate drugs therapeutic purposes Z79.891   3/6/2017 - Present    Dyslipidemia E78.5   7/11/2017 - Present      Health Maintenance        Date Due Completion Dates    PAP SMEAR 1/3/2005 ---    IMM INFLUENZA (1) 9/1/2017 ---    IMM DTaP/Tdap/Td Vaccine (2 - Td) 3/12/2019 3/12/2009            Current Immunizations     Tdap Vaccine 3/12/2009      Below and/or attached are the medications your provider expects you to take. Review all of your home medications and newly ordered medications with your provider and/or pharmacist. Follow medication instructions as directed by your provider and/or pharmacist. Please keep your medication list with you and share with your provider. Update the information when medications are discontinued, doses are changed, or new medications (including over-the-counter products) are added; and carry medication information at all times in the event of emergency situations     Allergies:  No Known Allergies          Medications  Valid as of: July 11, 2017 -  2:11 PM    Generic Name Brand Name Tablet Size Instructions for use    Hydrocodone-Acetaminophen (Tab) NORCO 7.5-325 MG Take 1 Tab by mouth 3 times a day as needed.        Ibuprofen (Tab) MOTRIN 800 MG TAKE 1 TABLET BY MOUTH EVERY 8 HOURS        .                 Medicines prescribed today were sent to:     [id number] [place] - [address]    [address] [facility] [address]    Phone: [phone] Fax: [phone]    Open 24 Hours?: No    [facility][id number] - [place] - [address]    [address] [place] [address]    Phone: [phone] Fax: [phone]    Open 24 Hours?: No    [facility] - [place], [place] - [address] AT PORTAL TO [facility]    [address] [facility] [address]    Phone: [phone] Fax: [phone]    Open 24 Hours?: No      Medication refill instructions:       If your prescription bottle indicates you have medication refills left, it is not necessary to call your provider’s office. Please contact your pharmacy and they will refill your medication.    If your prescription bottle indicates you do not have any refills left, you may request refills at any time through one of the following ways: The online [facility] system (except Urgent Care), by calling your provider’s office, or by asking your pharmacy to contact your provider’s office with a refill request. Medication refills are processed only during regular business hours and may not be available until the next business day. Your provider may request additional information or to have a follow-up visit with you prior to refilling your medication.   *Please Note: Medication refills are assigned a new Rx number when refilled electronically. Your pharmacy may indicate that no refills were authorized even though a new prescription for the same medication is available at the pharmacy. Please request the medicine by name with the pharmacy before contacting your provider for a refill.        Your To Do List     Future Labs/Procedures Complete By Expires    COMP METABOLIC PANEL  As directed 7/12/2018    LIPID PROFILE  As directed 7/12/2018      Other Notes About Your Plan     Urine drug screen need repeat to identify medication. She is a new start benzodiazepine present. No longer taking.           [facility] Access Code: Activation code not generated  Current [facility] Status: Active

## 2017-07-11 NOTE — TELEPHONE ENCOUNTER
Please call gurjit in [place] and provide authorization for patient to  up August prescription for norco on the 4th Friday as she is leaving out of town that weekend.   Lana Chase M.D.

## 2017-07-11 NOTE — ASSESSMENT & PLAN NOTE
She has daytime tiredness and snoring at sleep. She has an appointment for sleep study to be done end of August.

## 2017-07-14 NOTE — PROGRESS NOTES
"Subjective:   Joyce Cintron is a 32 y.o. female here today for evaluation and management of:     Hip pain  Chronic hip and low back pain on the left. Right above buttock on left side of spine with pain radiating down the side of her leg to above her knee and at times pain down the front of her leg.   No weakness, and after surgery the leg is not as strong as it was before. She has completed 3 round of PT.   She has had surgery for bursitis with Dr. Frankel with [facility]. She was referred out to an SI joint specialist and has not gone yet because she feels a little hopeless about it and is tired of getting injections and surgeries.   She has tried gabapentin which did not help and made her hair fall out.   She controls the pain with ibuprofen 800 mg bid and norco 7.5/325 which she uses once or twice a day.  She would like to revisit the interventions on the hip and back after sleep study.   She does not use alcohol or illegal drugs or benzodiazepines.   She does not need any refills today.     Snoring  She has daytime tiredness and snoring at sleep. She has an appointment for sleep study to be done end of August.              Current medicines (including changes today)  Current Outpatient Prescriptions   Medication Sig Dispense Refill   • hydrocodone-acetaminophen (NORCO) 7.5-325 MG per tablet Take 1 Tab by mouth 3 times a day as needed. 100 Tab 0   • ibuprofen (MOTRIN) 800 MG Tab TAKE 1 TABLET BY MOUTH EVERY 8 HOURS 90 Tab 0     No current facility-administered medications for this visit.     She  has a past medical history of Disc disorder; Anesthesia; Back pain; Pain (12/15/2015); and Depression.    ROS  No chest pain, no shortness of breath, no abdominal pain       Objective:     Blood pressure 118/72, pulse 74, temperature 36.9 °C (98.4 °F), resp. rate 16, height 1.664 m (5' 5.51\"), weight 80.74 kg (178 lb), SpO2 97 %. Body mass index is 29.16 kg/(m^2).   Physical Exam:  Constitutional: Alert, no " distress.  Skin: Warm, dry, good turgor, no rashes in visible areas.  Eye: Equal, round and reactive, conjunctiva clear, lids normal.  ENMT: Lips without lesions, good dentition, oropharynx clear.  Neck: Trachea midline, no masses, no thyromegaly. No cervical or supraclavicular lymphadenopathy  Respiratory: Unlabored respiratory effort, lungs clear to auscultation, no wheezes, no ronchi.  Cardiovascular: Normal S1, S2, no murmur, no edema.  Abdomen: Soft, non-tender, no masses, no hepatosplenomegaly.  Psych: Alert and oriented x3, normal affect and mood.        Assessment and Plan:   The following treatment plan was discussed    1. Arthralgia of hip, unspecified laterality  Patient advised to follow up with SI joint specialist as recommended by orthopedic specialist. She will do this after she has her sleep study  - COMP METABOLIC PANEL; Future    2. Dyslipidemia  Check labs  - LIPID PROFILE; Future    3. Snoring  Patient scheduled for sleep study      Followup: No Follow-up on file.

## 2017-07-21 NOTE — TELEPHONE ENCOUNTER
Was the patient seen in the last year in this department? Yes     Does patient have an active prescription for medications requested? No     Received Request Via: Pharmacy      Pt met protocol?: Yes pt last ov 7/17 medication was last d/c 5/30/17 rx completed

## 2017-07-21 NOTE — TELEPHONE ENCOUNTER
"Angela greco/mamta'd med 5/17 stating \"prescription completed\". Is this something you would like pt to continue?  "

## 2017-08-13 RX ORDER — BUSPIRONE HYDROCHLORIDE 15 MG/1
TABLET ORAL
Qty: 180 TAB | Refills: 0 | OUTPATIENT
Start: 2017-08-13

## 2017-08-21 ENCOUNTER — SLEEP STUDY (OUTPATIENT)
Dept: SLEEP MEDICINE | Facility: MEDICAL CENTER | Age: 32
End: 2017-08-21
Attending: INTERNAL MEDICINE
Payer: COMMERCIAL

## 2017-08-21 DIAGNOSIS — E66.9 OBESITY (BMI 35.0-39.9 WITHOUT COMORBIDITY): ICD-10-CM

## 2017-08-21 DIAGNOSIS — G47.00 INSOMNIA, UNSPECIFIED TYPE: ICD-10-CM

## 2017-08-21 DIAGNOSIS — G47.33 OSA (OBSTRUCTIVE SLEEP APNEA): ICD-10-CM

## 2017-08-21 PROCEDURE — 95810 POLYSOM 6/> YRS 4/> PARAM: CPT | Performed by: INTERNAL MEDICINE

## 2017-08-22 NOTE — PROCEDURES
Clinical Comments:  The patient underwent a comprehensive polysomnogram using the standard montage for measurement of parameters of sleep, respiratory events, movement abnormalities, heart rate and rhythm. A microphone was used to monitor snoring.        ANALYSIS:  The total recording time was 425.2 minutes with a sleep period of 417.2 minutes and the total sleep time was 261.5 minutes with a sleep efficiency of 61.5%.  The sleep latency was 8.0 minutes, and REM latency was 313.5 minutes.  The patient experienced 123 arousals in total, for an arousal index of 28.2     RESPIRATORY: The patient had 2 apneas in total.  Of these, 0 were obstructive apneas, and 2 were central apneas.  This resulted in an apnea index (AI) of 0.5.  The patient had 96 hypopneas, for a hypopnea index of 22.0.  The overall AHI was 22.5, while the AHI during Stage R sleep was 13.0.  AHI while supine was 26.7.     OXIMETRY: Oxygen saturation monitoring showed a mean SpO2 of 94.6%, with a minimum oxygen saturation of 92.0%.  Oxygen saturations were less than or = 89% for 0.0 minutes of sleep time.     CARDIAC: The highest heart rate during the recording was 119.0 beats per minute.  The average heart rate during sleep was 73.8 bpm.     LIMB MOVEMENTS: There were a total of 17 PLMs during sleep, of which 0 were PLMs arousals.  This resulted in a PLMS index of 3.9.      Interpretation:    Ms. Cintron presented with snoring, nocturnal apnea and excessive daytime somnolence.  This is a diagnostic study.    There is fragmentation of sleep with increased wake after sleep onset time totaling more than 2.5 hours and an elevated arousal and awakening index.  Significant REM sleep time is recorded.  There are occasional periodic limb movements but they do not affect sleep continuity.    The apnea hypopnea index is 22.5 events per hour, consisting almost exclusively of hypopnea episodes.  The lowest arterial oxygen saturation is 92% on room air.  The  heart rate varies from  bpm.    Assessment:  Moderate obstructive sleep apnea hypopnea with an apnea hypopnea index of 22.5 events per hour and a lowest arterial oxygen saturation of 92% on room air.  Fragmentation of sleep related to the breathing events and probably to laboratory effect.    Recommendations:  Airway pressurization, either guided by a titration polysomnogram or initiated through the use of auto titrating CPAP. Alternative treatments for sleep-disordered breathing include reconstructive otolaryngologic surgery, dental appliances and weight loss. If any these latter treatment options are selected, a follow-up polysomnogram is suggested to assess the efficacy of therapy. Behavioral measures including avoidance of sedatives, alcohol and supine body position may be of additional benefit.

## 2017-08-25 ENCOUNTER — SLEEP CENTER VISIT (OUTPATIENT)
Dept: SLEEP MEDICINE | Facility: MEDICAL CENTER | Age: 32
End: 2017-08-25
Payer: COMMERCIAL

## 2017-08-25 VITALS
OXYGEN SATURATION: 97 % | BODY MASS INDEX: 30.32 KG/M2 | HEIGHT: 65 IN | RESPIRATION RATE: 15 BRPM | DIASTOLIC BLOOD PRESSURE: 80 MMHG | WEIGHT: 182 LBS | HEART RATE: 99 BPM | SYSTOLIC BLOOD PRESSURE: 130 MMHG

## 2017-08-25 DIAGNOSIS — Z79.891 CHRONIC USE OF OPIATE DRUGS THERAPEUTIC PURPOSES: ICD-10-CM

## 2017-08-25 DIAGNOSIS — G47.33 OSA (OBSTRUCTIVE SLEEP APNEA): ICD-10-CM

## 2017-08-25 DIAGNOSIS — G47.00 INSOMNIA, UNSPECIFIED TYPE: ICD-10-CM

## 2017-08-25 DIAGNOSIS — E66.9 OBESITY (BMI 35.0-39.9 WITHOUT COMORBIDITY): ICD-10-CM

## 2017-08-25 PROCEDURE — 99213 OFFICE O/P EST LOW 20 MIN: CPT | Performed by: NURSE PRACTITIONER

## 2017-08-25 NOTE — PROGRESS NOTES
"Chief Complaint   Patient presents with   • Results     PSG         HPI: This patient is a 32 y.o. female, who presents for sleep study results. Medical history includes obesity, chronic hip pain, anxiety disorder.    Sleep symptoms include frequent nocturnal awakenings, chronic insomnia, Significant daytime hypersomnolence, snoring, witnessed apneas, resuscitative gasps. PSG indicates moderate sleep apnea with an AHI of 22.5, minimum oxygen saturation of 92 %. Her events were primarily hypopneas. Testing reviewed in detail with the patient. She did not meet criteria for split-night study. In lab Titration versus trial of auto CPAP was discussed.       Past Medical History   Diagnosis Date   • Disc disorder    • Anesthesia      PONV   • Back pain    • Pain 12/15/2015     left hip and buttock   • Depression        Social History   Substance Use Topics   • Smoking status: Never Smoker    • Smokeless tobacco: Never Used   • Alcohol Use: 0.0 oz/week     0 Standard drinks or equivalent per week      Comment: 1-2 [per month       Family History   Problem Relation Age of Onset   • Heart Disease Mother    • Heart Disease Father    • Heart Disease Paternal Grandmother    • Heart Disease Paternal Grandfather    • Diabetes     • Hypertension         Current medications as of today   Current Outpatient Prescriptions   Medication Sig Dispense Refill   • hydrocodone-acetaminophen (NORCO) 7.5-325 MG per tablet Take 1 Tab by mouth 3 times a day as needed. 100 Tab 0   • ibuprofen (MOTRIN) 800 MG Tab TAKE 1 TABLET BY MOUTH EVERY 8 HOURS 90 Tab 0     No current facility-administered medications for this visit.       Allergies: Review of patient's allergies indicates no known allergies.    Blood pressure 130/80, pulse 99, resp. rate 15, height 1.651 m (5' 5\"), weight 82.555 kg (182 lb), SpO2 97 %.      ROS:   Constitutional: Denies fevers, chills, night sweats, weight lossPositive fatigue.  Eyes: Denies pain, discharge/drainage  ENT: " Denies tinnitus, hearing loss, sinusitis, hoarseness, epistaxis  Allergic: Denies Allergic rhinitis or hayfever  Respiratory: Denies cough, wheeze, dyspnea, hemoptysis  Cardiovascular: Denies chest pain, tightness, palpitations, orthopnea or edema  Sleep: See HPI  GI/: Denies heartburn, nausea, vomiting, urinary incontinence, hematuria  Musculoskeletal: Chronic hip pain  Neurological: Denies vertigo or headaches  Skin: Denies rashes, lesions  Psychiatric: Denies depression or anxiety    Physical exam:   Constitutional: Well-nourished, well-developed, in no acute distress  Neck: supple, no masses  Respiratory: no intercostal retractions or accessory muscle use   Lungs auscultation: Clear to auscultation bilaterally  Cardiovascular: Regular rate rhythm no murmurs, rubs or gallops  Musculoskeletal: Normal gait, no clubbing or cyanosis  Skin: No rashes or lesions  Neuro: No focal deficit, cranial nerves grossly intact  Psychiatric: Oriented to time, person and place.     Diagnosis:  1. Obesity (BMI 35.0-39.9 without comorbidity) (HCC)     2. Chronic use of opiate drugs therapeutic purposes     3. BABAK (obstructive sleep apnea)  POLYSOMNOGRAPHY TITRATION    DME CPAP   4. Insomnia, unspecified type         Plan:  Patient has significant symptoms which greatly affect her daily life. She is eager to initiate therapy. She understands she may ultimately require a titration. She would like to start auto CPAP for now. She does use narcotics for pain control but has not used in the last week and a half. She is cutting back and uses Norco infrequently. She did not have evidence of significant central apnea on PSG.    1. Trial of auto CPAP 5-15 cm H2O  2. Schedule titration study today  3. Follow-up in approximately 8-10 weeks before titration study to review compliance chip. If symptoms resolve and compliance looks good, cancel titration

## 2017-08-25 NOTE — PATIENT INSTRUCTIONS
1. Initiate auto CPAP order to [facility], you should hear from them in approx 7-10 days, if you do not, please call our office  2. Schedule titration study today  3. Follow-up in approximately 8-10 weeks before titration study to review compliance chip. If symptoms resolve and compliance looks good, cancel titration

## 2017-08-25 NOTE — MR AVS SNAPSHOT
"        Joyce Arcostra   2017 11:20 AM   Sleep Center Visit   MRN: [id number]    Department:  [facility]   Dept Phone:  [phone]    Description:  Female : 1985   Provider:  ISMA Rojas           Reason for Visit     Results PSG      Allergies as of 2017     No Known Allergies      You were diagnosed with     Obesity (BMI 35.0-39.9 without comorbidity) (HCC)   [183985]       Chronic use of opiate drugs therapeutic purposes   [5439593]       BABAK (obstructive sleep apnea)   [590584]       Insomnia, unspecified type   [4464876]         Vital Signs     Blood Pressure Pulse Respirations Height Weight Body Mass Index    130/80 mmHg 99 15 1.651 m (5' 5\") 82.555 kg (182 lb) 30.29 kg/m2    Oxygen Saturation Smoking Status                97% Never Smoker           Basic Information     Date Of Birth Sex Race Ethnicity Preferred Language    1985 Female White Non- English      Your appointments     Aug 30, 2017  4:40 PM   Established Patient with TOBIAS Spencer   [facility]    [address] [place] [id number] [phone]           You will be receiving a confirmation call a few days before your appointment from our automated call confirmation system.            Oct 27, 2017  7:10 PM   Sleep Study with SLEEP TECH   [facility] (--)    [email or web address]  [facility] [address]  [place] [id number] [phone]            2017  8:40 AM   Follow UP with ISMA Rojas   [facility] (--)    [email or web address]  [facility] [address]  [place] [address]   [phone]              Problem List              ICD-10-CM Priority Class Noted - Resolved    Stress and adjustment reaction F43.29   3/18/2015 - Present    Vitamin D deficiency E55.9   3/18/2015 - Present    Hip pain M25.559   3/18/2015 - Present    Lazy eye H53.009   2015 - Present    Insomnia G47.00   2015 - " Present    Hair loss L65.9   8/20/2015 - Present    Family history of MI (myocardial infarction) Z82.49   9/23/2016 - Present    Snoring R06.83   3/6/2017 - Present    Sore throat J02.9   3/6/2017 - Present    Obesity (BMI 35.0-39.9 without comorbidity) (HCC) E66.9   3/6/2017 - Present    Chronic use of opiate drugs therapeutic purposes Z79.891   3/6/2017 - Present    Dyslipidemia E78.5   7/11/2017 - Present    BABAK (obstructive sleep apnea) G47.33   8/25/2017 - Present      Health Maintenance        Date Due Completion Dates    PAP SMEAR 1/3/2005 ---    IMM INFLUENZA (1) 9/1/2017 ---    IMM DTaP/Tdap/Td Vaccine (2 - Td) 3/12/2019 3/12/2009            Current Immunizations     Tdap Vaccine 3/12/2009      Below and/or attached are the medications your provider expects you to take. Review all of your home medications and newly ordered medications with your provider and/or pharmacist. Follow medication instructions as directed by your provider and/or pharmacist. Please keep your medication list with you and share with your provider. Update the information when medications are discontinued, doses are changed, or new medications (including over-the-counter products) are added; and carry medication information at all times in the event of emergency situations     Allergies:  No Known Allergies          Medications  Valid as of: August 25, 2017 - 12:02 PM    Generic Name Brand Name Tablet Size Instructions for use    Hydrocodone-Acetaminophen (Tab) NORCO 7.5-325 MG Take 1 Tab by mouth 3 times a day as needed.        Ibuprofen (Tab) MOTRIN 800 MG TAKE 1 TABLET BY MOUTH EVERY 8 HOURS        .                 Medicines prescribed today were sent to:     [id number] - [place], [place] - [address]    [address] [facility] [address]    Phone: [phone] Fax: [phone]    Open 24 Hours?: No    [id number] - [place], [place] - [address]    [address] [place] [address]    Phone: [phone] Fax: [phone]    Open 24 Hours?: No    [facility] - [place], [place] - [address] AT PORTAL TO REGISTERED [facility] SIT[address]    [address] [facility] [address]    Phone: [phone] Fax: [phone]    Open 24 Hours?: No      Medication refill instructions:       If your prescription bottle indicates you have medication refills left, it is not necessary to call your provider’s office. Please contact your pharmacy and they will refill your medication.    If your prescription bottle indicates you do not have any refills left, you may request refills at any time through one of the following ways: The online [employer] system (except Urgent Care), by calling your provider’s office, or by asking your pharmacy to contact your provider’s office with a refill request. Medication refills are processed only during regular business hours and may not be available until the next business day. Your provider may request additional information or to have a follow-up visit with you prior to refilling your medication.   *Please Note: Medication refills are assigned a new Rx number when refilled electronically. Your pharmacy may indicate that no refills were authorized even though a new prescription for the same medication is available at the pharmacy. Please request the medicine by name with the pharmacy before contacting your provider for a refill.        Your To Do List     Future Labs/Procedures Complete By Expires    POLYSOMNOGRAPHY TITRATION  As directed 8/25/2018      Instructions    1. Initiate auto CPAP order to [facility], you should hear from them in approx 7-10 days, if you do not, please call our office  2. Schedule titration study today  3. Follow-up in approximately 8-10 weeks before titration study to review compliance chip. If symptoms resolve and compliance looks good, cancel titration       Other Notes About Your Plan     Urine drug screen  need repeat to identify medication. She is a new start benzodiazepine present. No longer taking.           [email or web address] Access Code: Activation code not generated  Current Flexuspine Status: Active

## 2017-08-27 DIAGNOSIS — F32.9 REACTIVE DEPRESSION: ICD-10-CM

## 2017-08-27 DIAGNOSIS — F41.9 ANXIETY: ICD-10-CM

## 2017-08-27 DIAGNOSIS — F32.A DEPRESSION: ICD-10-CM

## 2017-08-29 RX ORDER — CITALOPRAM 40 MG/1
TABLET ORAL
Qty: 90 TAB | Refills: 0 | Status: SHIPPED | OUTPATIENT
Start: 2017-08-29 | End: 2017-08-30

## 2017-08-29 NOTE — TELEPHONE ENCOUNTER
Was the patient seen in the last year in this department? Yes     Does patient have an active prescription for medications requested? No     Received Request Via: Pharmacy      Pt met protocol?: Yes, labs 9/15 ov 7/17

## 2017-08-30 ENCOUNTER — OFFICE VISIT (OUTPATIENT)
Dept: MEDICAL GROUP | Facility: PHYSICIAN GROUP | Age: 32
End: 2017-08-30
Payer: COMMERCIAL

## 2017-08-30 VITALS
BODY MASS INDEX: 30.05 KG/M2 | HEIGHT: 66 IN | HEART RATE: 128 BPM | OXYGEN SATURATION: 96 % | TEMPERATURE: 98.5 F | RESPIRATION RATE: 20 BRPM | SYSTOLIC BLOOD PRESSURE: 130 MMHG | DIASTOLIC BLOOD PRESSURE: 78 MMHG | WEIGHT: 187 LBS

## 2017-08-30 DIAGNOSIS — M25.551 RIGHT HIP PAIN: ICD-10-CM

## 2017-08-30 DIAGNOSIS — Z79.891 CHRONIC USE OF OPIATE DRUGS THERAPEUTIC PURPOSES: ICD-10-CM

## 2017-08-30 DIAGNOSIS — G47.33 OSA (OBSTRUCTIVE SLEEP APNEA): ICD-10-CM

## 2017-08-30 PROCEDURE — 99214 OFFICE O/P EST MOD 30 MIN: CPT | Performed by: NURSE PRACTITIONER

## 2017-08-30 RX ORDER — HYDROCODONE BITARTRATE AND ACETAMINOPHEN 7.5; 325 MG/1; MG/1
1 TABLET ORAL 3 TIMES DAILY PRN
Qty: 90 TAB | Refills: 0 | Status: SHIPPED | OUTPATIENT
Start: 2017-08-30 | End: 2017-08-30 | Stop reason: SDUPTHER

## 2017-08-30 RX ORDER — HYDROCODONE BITARTRATE AND ACETAMINOPHEN 7.5; 325 MG/1; MG/1
1 TABLET ORAL 3 TIMES DAILY PRN
Qty: 90 TAB | Refills: 0 | Status: SHIPPED | OUTPATIENT
Start: 2017-08-30 | End: 2017-11-29 | Stop reason: SDUPTHER

## 2017-08-30 ASSESSMENT — PATIENT HEALTH QUESTIONNAIRE - PHQ9: CLINICAL INTERPRETATION OF PHQ2 SCORE: 0

## 2017-08-30 NOTE — ASSESSMENT & PLAN NOTE
Chronic pain recheck: pt here for pain management and medication refill. She takes Norco 7.5-325 mg 1 pill up to 3 times per day forleft hip femoro-acetabular impingement with cam-type bursitis and tight IT band. She has recently traveled quite a bit over the last 3 months and her dose was temporarily increased. But she is ready to go back to previous dose of 1 pill up to 3 times per day.  I discussed with patient the risks, benefits and side effects of these medications. Patient was reminded that there would be no early refills and that lost and stolen prescriptions would not be replaced. I counseled patient on the fact that there is no clear evidence that ongoing opioid therapy is beneficial for chronic pain. Patient was instructed to keep pain medications safe and locked up if necessary. Patient was given 3 separate prescriptions for a 3 month supply to take to pharmacy right away. Patient is to take these exactly as prescribed and was advised not to drink alcohol or use illicit substances. The [place] , contract and recent UDS were reviewed and patient is taking medications appropriately.     Last dose of controlled substance: a couple of days  Chronic pain treated with Norco 7.5-325 mg 1 pill taken 3 times a day    She  reports that she drinks alcohol.  She  reports that she does not use drugs.    Consequences of Chronic Opiate therapy:  (5 A's)  Analgesia: Compared to no treatment or prior treatment, pain is currently improved  Activity: improved  Adverse Events: She denies constipation, dry mouth, itchy skin, nausea and sedation  Aberrant Behaviors: She reports she is taking medication as prescribed and is not veering from agreed treatment regimen. There have been no inappropriate refills or lost/stolen meds reported.   Affect/Mood: Pain is not impacting patient's mood.  Patient denies depression/anxiety.    Nonnarcotic treatments that are being used: NSAIDs/HOLLINGSWORTH-2.   Treatment goals discussed.    Opioid  Risk Score: 2    Interpretation of Opioid Risk Score   Score 0-3 = Low risk of abuse. Do UDS at least once per year.  Score 4-7 = Moderate risk of abuse. Do UDS 1-4 times per year.  Score 8+ = High risk of abuse. Refer to specialist.    Last order of CONTROLLED SUBSTANCE TREATMENT AGREEMENT was found on 6/9/2016 from Office Visit on 6/9/2016     UDS Summary                URINE DRUG SCREEN Overdue 6/4/2017      Done 6/9/2016 URINE DRUG SCREEN        Most recent UDS reviewed today and is consistent with prescribed medications.    I have reviewed the medical records, the Prescription Monitoring Program and I have determined that controlled substance treatment is medically indicated.

## 2017-08-30 NOTE — PROGRESS NOTES
Chief Complaint   Patient presents with   • Medication Refill     pain med         This is a 32 y.o.female patient that presents today with the following:Pain management follow-up and medication refills    Chronic use of opiate drugs therapeutic purposes  Chronic pain recheck: pt here for pain management and medication refill. She takes Norco 7.5-325 mg 1 pill up to 3 times per day forleft hip femoro-acetabular impingement with cam-type bursitis and tight IT band. She has recently traveled quite a bit over the last 3 months and her dose was temporarily increased. But she is ready to go back to previous dose of 1 pill up to 3 times per day.  I discussed with patient the risks, benefits and side effects of these medications. Patient was reminded that there would be no early refills and that lost and stolen prescriptions would not be replaced. I counseled patient on the fact that there is no clear evidence that ongoing opioid therapy is beneficial for chronic pain. Patient was instructed to keep pain medications safe and locked up if necessary. Patient was given 3 separate prescriptions for a 3 month supply to take to pharmacy right away. Patient is to take these exactly as prescribed and was advised not to drink alcohol or use illicit substances. The [place] , contract and recent UDS were reviewed and patient is taking medications appropriately. She is due for renewal of controlled substance agreement and UDS. This was done today.     Last dose of controlled substance: a couple of days  Chronic pain treated with Norco 7.5-325 mg 1 pill taken 3 times a day    She  reports that she drinks alcohol.  She  reports that she does not use drugs.    Consequences of Chronic Opiate therapy:  (5 A's)  Analgesia: Compared to no treatment or prior treatment, pain is currently improved  Activity: improved  Adverse Events: She denies constipation, dry mouth, itchy skin, nausea and sedation  Aberrant Behaviors: She reports she is  taking medication as prescribed and is not veering from agreed treatment regimen. There have been no inappropriate refills or lost/stolen meds reported.   Affect/Mood: Pain is not impacting patient's mood.  Patient denies depression/anxiety.    Nonnarcotic treatments that are being used: NSAIDs/HOLLINGSWORTH-2.   Treatment goals discussed.    Opioid Risk Score: 2    Interpretation of Opioid Risk Score   Score 0-3 = Low risk of abuse. Do UDS at least once per year.  Score 4-7 = Moderate risk of abuse. Do UDS 1-4 times per year.  Score 8+ = High risk of abuse. Refer to specialist.    Last order of CONTROLLED SUBSTANCE TREATMENT AGREEMENT was found on 6/9/2016 from Office Visit on 6/9/2016     UDS Summary                URINE DRUG SCREEN Overdue 6/4/2017      Done 6/9/2016 URINE DRUG SCREEN        Most recent UDS reviewed today and is consistent with prescribed medications.    I have reviewed the medical records, the Prescription Monitoring Program and I have determined that controlled substance treatment is medically indicated.        BABAK (obstructive sleep apnea)  Patient has recently been diagnosed with obstructive sleep apnea and is waiting for equipment from [facility] (CPAP).      No visits with results within 1 Month(s) from this visit.   Latest known visit with results is:   Office Visit on 05/14/2017   Component Date Value   • Rapid Strep Screen 05/14/2017 neg    • Internal Control Positive 05/14/2017 Positive    • Internal Control Negative 05/14/2017 Negative          clinical course has been stable    Past Medical History:   Diagnosis Date   • Pain 12/15/2015    left hip and buttock   • Anesthesia     PONV   • Back pain    • Depression    • Disc disorder        Past Surgical History:   Procedure Laterality Date   • HIP ARTHROSCOPY Left 12/17/2015    Procedure: HIP ARTHROSCOPY bursectomy, gluteal eval, osteoplasty, labral debridement, chondroplasty, femoral neck decompression, IT band release;  Surgeon: Obdulio Frankel,  "M.D.;  Location: SURGERY [facility];  Service:    • DENTAL EXTRACTION(S)  2003    wisdom teeth       Family History   Problem Relation Age of Onset   • Heart Disease Mother    • Heart Disease Father    • Heart Disease Paternal Grandmother    • Heart Disease Paternal Grandfather    • Diabetes     • Hypertension         Review of patient's allergies indicates no known allergies.    Current Outpatient Prescriptions Ordered in [facility]   Medication Sig Dispense Refill   • hydrocodone-acetaminophen (NORCO) 7.5-325 MG per tablet Take 1 Tab by mouth 3 times a day as needed. 90 Tab 0   • ibuprofen (MOTRIN) 800 MG Tab TAKE 1 TABLET BY MOUTH EVERY 8 HOURS 90 Tab 0     No current Epic-ordered facility-administered medications on file.        Constitutional ROS: No unexpected change in weight, No weakness, No unexplained fevers, sweats, or chills  Pulmonary ROS: No chronic cough, sputum, or hemoptysis, No shortness of breath, No recent change in breathing. Positive for BABAK, per history of present illness  Cardiovascular ROS: No chest pain, No edema, No palpitations  Gastrointestinal ROS: No abdominal pain, No nausea, vomiting, diarrhea, or constipation, no blood in stool  Musculoskeletal/Extremities ROS: Positive per history of present illness  Neurologic ROS: Normal development, No seizures, No weakness    Physical exam:  /78   Pulse (!) 128   Temp 36.9 °C (98.5 °F)   Resp 20   Ht 1.664 m (5' 5.5\")   Wt 84.8 kg (187 lb)   SpO2 96%   BMI 30.65 kg/m²   General Appearance: Young female, alert, no distress, mildly obese, well-groomed  Lungs: negative findings: normal respiratory rate and rhythm, lungs clear to auscultation  Abdomen: Abdomen soft, non-tender. BS normal. No masses,  No organomegaly  Musculoskeletal: positive findings: Positive for decreased range of motion the left hip and lower extremity due to pain  Neurologic: intact, oriented, mood appropriate, judgment intact. Cranial nerves II through XII " grossly intact    Medical decision making/discussion: Patient here for pain management follow-up and medication refills. Patient was given 3 separate prescriptions to take to the pharmacy right away. Patient was instructed to take medications exactly as prescribed and was reminded that there would be no early refills and that lost or stolen prescriptions would not be replaced. Patient was reminded not to drink alcohol or take any other illicit substances while on these medications. Patient to follow-up in 3 months.  She is due for renewal of UDS and controlled substance agreement, this was completed today.    Joyce was seen today for medication refill.    Diagnoses and all orders for this visit:    Chronic use of opiate drugs therapeutic purposes  -     hydrocodone-acetaminophen (NORCO) 7.5-325 MG per tablet; Take 1 Tab by mouth 3 times a day as needed.  -     CONTROLLED SUBSTANCE TREATMENT AGREEMENT  -     [facility] PAIN MANAGEMENT SCREEN; Future    Right hip pain  -     hydrocodone-acetaminophen (NORCO) 7.5-325 MG per tablet; Take 1 Tab by mouth 3 times a day as needed.    BABAK (obstructive sleep apnea)          Please note that this dictation was created using voice recognition software. I have made every reasonable attempt to correct obvious errors, but I expect that there are errors of grammar and possibly content that I did not discover before finalizing the note.

## 2017-08-30 NOTE — ASSESSMENT & PLAN NOTE
Patient has recently been diagnosed with obstructive sleep apnea and is waiting for equipment from DME (CPAP).

## 2017-09-18 ENCOUNTER — HOSPITAL ENCOUNTER (OUTPATIENT)
Facility: MEDICAL CENTER | Age: 32
End: 2017-09-18
Attending: OPHTHALMOLOGY | Admitting: OPHTHALMOLOGY
Payer: COMMERCIAL

## 2017-10-19 ENCOUNTER — APPOINTMENT (OUTPATIENT)
Dept: SLEEP MEDICINE | Facility: MEDICAL CENTER | Age: 32
End: 2017-10-19
Payer: COMMERCIAL

## 2017-10-25 ENCOUNTER — APPOINTMENT (OUTPATIENT)
Dept: SLEEP MEDICINE | Facility: MEDICAL CENTER | Age: 32
End: 2017-10-25
Payer: COMMERCIAL

## 2017-10-25 ENCOUNTER — TELEPHONE (OUTPATIENT)
Dept: SLEEP MEDICINE | Facility: MEDICAL CENTER | Age: 32
End: 2017-10-25

## 2017-10-25 RX ORDER — ZOLPIDEM TARTRATE 5 MG/1
5 TABLET ORAL NIGHTLY PRN
Qty: 30 TAB | Refills: 0 | Status: SHIPPED
Start: 2017-10-25 | End: 2018-03-16 | Stop reason: SDUPTHER

## 2017-10-25 NOTE — TELEPHONE ENCOUNTER
Pt is doing well with CPAP she is just having trouble falling asleep, she would like to get Rx for ambien so she can get used to machine. Is this ok? [facility]        **SS titration for 10/27 was cxl'd as she couldn't come in, but [facility] would not have paid for it due to her trail of cpap only being over a month and she seems to be doing well with it advised her to come in on 11/3 for compliance, if titration is to be ordered in the future she has to fail cpap after 12 week trail.**

## 2017-10-27 ENCOUNTER — APPOINTMENT (OUTPATIENT)
Dept: SLEEP MEDICINE | Facility: MEDICAL CENTER | Age: 32
End: 2017-10-27
Attending: NURSE PRACTITIONER
Payer: COMMERCIAL

## 2017-11-03 ENCOUNTER — SLEEP CENTER VISIT (OUTPATIENT)
Dept: SLEEP MEDICINE | Facility: MEDICAL CENTER | Age: 32
End: 2017-11-03
Payer: COMMERCIAL

## 2017-11-03 VITALS
DIASTOLIC BLOOD PRESSURE: 70 MMHG | HEART RATE: 76 BPM | OXYGEN SATURATION: 96 % | RESPIRATION RATE: 15 BRPM | SYSTOLIC BLOOD PRESSURE: 110 MMHG | WEIGHT: 187 LBS | HEIGHT: 65 IN | BODY MASS INDEX: 31.16 KG/M2

## 2017-11-03 DIAGNOSIS — G47.33 OSA (OBSTRUCTIVE SLEEP APNEA): ICD-10-CM

## 2017-11-03 DIAGNOSIS — G47.00 INSOMNIA, UNSPECIFIED TYPE: ICD-10-CM

## 2017-11-03 PROCEDURE — 99213 OFFICE O/P EST LOW 20 MIN: CPT | Performed by: NURSE PRACTITIONER

## 2017-11-03 NOTE — PROGRESS NOTES
Chief Complaint   Patient presents with   • Follow-Up     1st complience DL         HPI: This patient is a 32 y.o. female, who presents for 6 week follow-up BABAK. Medical history includes obesity, chronic hip pain, anxiety disorder.     Sleep symptoms include frequent nocturnal awakenings, chronic insomnia, Significant daytime hypersomnolence, snoring, witnessed apneas, resuscitative gasps. PSG indicates moderate sleep apnea with an AHI of 22.5, minimum oxygen saturation of 92 %. Her events were primarily hypopneas. She was initiated on auto CPAP 5-15 cm H2O after last visit. Compliance download over the past 30 days indicates 100 % compliance, average use of 8 hours 43 minutes per night, AHI of 2.5. Mask and pressures are comfortable. She has noticed improvement in the quality of her sleep and she feels more rested during the day. She denies a.m. headaches. Her fitbit still reports restless sleep. She reports that she is sleeping approximately 4-5 hours per night, then waking up. She is able to fall back asleep but has some fatigue in the AM's.    Past Medical History:   Diagnosis Date   • Anesthesia     PONV   • Back pain    • Depression    • Disc disorder    • Pain 12/15/2015    left hip and buttock       Social History   Substance Use Topics   • Smoking status: Never Smoker   • Smokeless tobacco: Never Used   • Alcohol use 0.0 oz/week      Comment: 1-2 [per month       Family History   Problem Relation Age of Onset   • Heart Disease Mother    • Heart Disease Father    • Heart Disease Paternal Grandmother    • Heart Disease Paternal Grandfather    • Diabetes     • Hypertension         Current medications as of today   Current Outpatient Prescriptions   Medication Sig Dispense Refill   • hydrocodone-acetaminophen (NORCO) 7.5-325 MG per tablet Take 1 Tab by mouth 3 times a day as needed. 90 Tab 0   • ibuprofen (MOTRIN) 800 MG Tab TAKE 1 TABLET BY MOUTH EVERY 8 HOURS 90 Tab 0   • zolpidem (AMBIEN) 5 MG Tab Take 1 Tab  "by mouth at bedtime as needed for Sleep (insomnia). Take 1 tab at bedtime as needed for insomnia. 30 Tab 0     No current facility-administered medications for this visit.        Allergies: Review of patient's allergies indicates no known allergies.    Blood pressure 110/70, pulse 76, resp. rate 15, height 1.651 m (5' 5\"), weight 84.8 kg (187 lb), SpO2 96 %.      ROS:   Constitutional: Denies fevers, chills, night sweats, weight loss. Positive mild fatigue although improved  Eyes: Denies pain, discharge/drainage  ENT: Denies tinnitus, hearing loss, sinusitis, hoarseness, epistaxis  Allergic: Denies Allergic rhinitis or hayfever  Respiratory: Denies cough, wheeze, dyspnea, hemoptysis  Cardiovascular: Denies chest pain, tightness, palpitations, orthopnea or edema  Sleep: See HPI  GI/: Denies heartburn, nausea, vomiting, urinary incontinence, hematuria  Musculoskeletal: Denies back pain, painful joints, sore muscles  Neurological: Denies vertigo or headaches  Skin: Denies rashes, lesions  Psychiatric: Denies depression or anxiety    Physical exam:   Constitutional: Well-nourished, well-developed, in no acute distress  Eyes: PERRL, lazy eye on the left  Neck: supple, no masses  Respiratory: no intercostal retractions or accessory muscle use   Lungs auscultation: Clear to auscultation bilaterally  Cardiovascular: Regular rate rhythm no murmurs, rubs or gallops  Musculoskeletal: Normal gait, no clubbing or cyanosis  Skin: No rashes or lesions  Neuro: No focal deficit, cranial nerves grossly intact  Psychiatric: Oriented to time, person and place.     Diagnosis:  1. BABAK (obstructive sleep apnea)  DME [facility]   2. Insomnia, unspecified type         Plan:    1. Continue CPAP nightly  2. Clean mask and tubing weekly  3. Verify nocturnal oxygen saturation. Order for testing to [facility]. Call for results  4. Follow-up here in 6 months, sooner if needed  5. Follow-up with PCP for routine blood work    "

## 2017-11-03 NOTE — PATIENT INSTRUCTIONS
1. Continue CPAP nightly  2. Clean mask and tubing weekly  3. Verify nocturnal oxygen saturation. Order for testing to [facility]. Okay to call our office for results once testing completed. [facility] should contact you in the next week.  4. Follow-up here in 6 months, sooner if needed

## 2017-11-29 ENCOUNTER — OFFICE VISIT (OUTPATIENT)
Dept: MEDICAL GROUP | Facility: PHYSICIAN GROUP | Age: 32
End: 2017-11-29
Payer: COMMERCIAL

## 2017-11-29 VITALS
SYSTOLIC BLOOD PRESSURE: 116 MMHG | WEIGHT: 187.2 LBS | RESPIRATION RATE: 16 BRPM | HEIGHT: 65 IN | DIASTOLIC BLOOD PRESSURE: 84 MMHG | OXYGEN SATURATION: 98 % | BODY MASS INDEX: 31.19 KG/M2 | TEMPERATURE: 98.9 F | HEART RATE: 110 BPM

## 2017-11-29 DIAGNOSIS — M25.559 ARTHRALGIA OF HIP, UNSPECIFIED LATERALITY: ICD-10-CM

## 2017-11-29 DIAGNOSIS — M25.551 RIGHT HIP PAIN: ICD-10-CM

## 2017-11-29 DIAGNOSIS — J02.9 SORE THROAT: ICD-10-CM

## 2017-11-29 DIAGNOSIS — Z79.891 CHRONIC USE OF OPIATE DRUGS THERAPEUTIC PURPOSES: ICD-10-CM

## 2017-11-29 PROCEDURE — 99213 OFFICE O/P EST LOW 20 MIN: CPT | Performed by: NURSE PRACTITIONER

## 2017-11-29 RX ORDER — HYDROCODONE BITARTRATE AND ACETAMINOPHEN 7.5; 325 MG/1; MG/1
1 TABLET ORAL 3 TIMES DAILY PRN
Qty: 90 TAB | Refills: 0 | Status: SHIPPED | OUTPATIENT
Start: 2017-11-29 | End: 2017-11-29 | Stop reason: SDUPTHER

## 2017-11-29 RX ORDER — HYDROCODONE BITARTRATE AND ACETAMINOPHEN 7.5; 325 MG/1; MG/1
1 TABLET ORAL 3 TIMES DAILY PRN
Qty: 90 TAB | Refills: 0 | Status: SHIPPED | OUTPATIENT
Start: 2017-12-29 | End: 2018-01-28

## 2017-11-29 RX ORDER — HYDROCODONE BITARTRATE AND ACETAMINOPHEN 7.5; 325 MG/1; MG/1
1 TABLET ORAL 3 TIMES DAILY PRN
Qty: 90 TAB | Refills: 0 | Status: SHIPPED | OUTPATIENT
Start: 2018-01-29 | End: 2018-02-28

## 2017-11-29 RX ORDER — HYDROCODONE BITARTRATE AND ACETAMINOPHEN 7.5; 325 MG/1; MG/1
1 TABLET ORAL 3 TIMES DAILY PRN
Qty: 90 TAB | Refills: 0 | Status: SHIPPED | OUTPATIENT
Start: 2017-11-29 | End: 2017-12-29

## 2017-11-29 NOTE — ASSESSMENT & PLAN NOTE
"This patient is continuing to use a controlled substance (CS) on a long term basis.  The patient is thoroughly aware of the goals of treatment with the CS  The patient is aware that yearly and random urine drug screens are required.  The patient has been instructed to take the CS only as prescribed.  The patient is prohibited from sharing the CS with any other person.  The patient is instructed to inform the provider if any other CS is taken, of any alcohol or cannabis or other recreational drug use, any treatment for side effects of the CS or complications, if they have CS active rx in other states  The patient has evidence for a reason for the CS  The treatment plan has been discussed with the patient  The  report has been reviewed    Patient is waiting to see SI joint specialist. She has be hesitant to make the appointment due to last experience.    /84   Pulse (!) 110   Temp 37.2 °C (98.9 °F)   Resp 16   Ht 1.651 m (5' 5\")   Wt 84.9 kg (187 lb 3.2 oz)   LMP 01/01/2016   SpO2 98%   Breastfeeding? No   BMI 31.15 kg/m²     Chronic pain recheck:   Last dose of controlled substance: today in AM   Chronic pain treated with Norco 7.5 mg  taken 3 times a day    She  reports that she drinks about 0.6 oz of alcohol per week .  She  reports that she does not use drugs.    Consequences of Chronic Opiate therapy:  (5 A's)  Analgesia: Compared to no treatment or prior treatment, pain is currently improved  Activity: improved  Adverse Events: She denies constipation, dry mouth, itchy skin, nausea and sedation  Aberrant Behaviors: She reports she is taking medication as prescribed and is not veering from agreed treatment regimen. There have been no inappropriate refills or lost/stolen meds reported.   Affect/Mood: Pain is impacting patient's mood.  Patient reports depression/anxiety.    Nonnarcotic treatments that are being used: tricyclic antidepressants.   Treatment goals discussed.    Opioid Risk Score: " 2    Interpretation of Opioid Risk Score   Score 0-3 = Low risk of abuse. Do UDS at least once per year.  Score 4-7 = Moderate risk of abuse. Do UDS 1-4 times per year.  Score 8+ = High risk of abuse. Refer to specialist.    Last order of CONTROLLED SUBSTANCE TREATMENT AGREEMENT was found on 8/30/2017 from Office Visit on 8/30/2017     UDS Summary                URINE DRUG SCREEN Next Due 8/25/2018      Done 8/30/2017 [facility] PAIN MANAGEMENT SCREEN     Patient has more history with this topic...        Most recent UDS reviewed today and is consistent with prescribed medications.    I have reviewed the medical records, the Prescription Monitoring Program and I have determined that controlled substance treatment is medically indicated.

## 2017-11-30 NOTE — ASSESSMENT & PLAN NOTE
Patient complains of a few days of sore throat and sinus pressure. She brought it to my attention distention of her ears and throat checked as she does have a history of atypical strep infections in the past. No fever no chills no rash and no one is ill at home.

## 2017-11-30 NOTE — PROGRESS NOTES
"Chief Complaint   Patient presents with   • Medication Refill     Norco  Sinus Issue, headache/pressure, swollen glands, drainage, bilateral ear pain       HISTORY OF PRESENT ILLNESS: Patient is a @ age 32 here  today to discuss:     Interval history:     Hospitalizations  : no     Injuries  : no     Illness : no     Diagnosis of sleep apnea and on CPAP        Hip pain  This patient is continuing to use a controlled substance (CS) on a long term basis.  Patient has tried multiple modalities for pain control.  She is planning to have SI injections but waiting until able to set up   The patient is thoroughly aware of the goals of treatment with the CS  The patient is aware that yearly and random urine drug screens are required.  The patient has been instructed to take the CS only as prescribed.  The patient is prohibited from sharing the CS with any other person.  The patient is instructed to inform the provider if any other CS is taken, of any alcohol or cannabis or other recreational drug use, any treatment for side effects of the CS or complications, if they have CS active rx in other states  The patient has evidence for a reason for the CS  The treatment plan has been discussed with the patient  The  report has been reviewed    Patient is waiting to see SI joint specialist. She has be hesitant to make the appointment due to last experience.    /84   Pulse (!) 110   Temp 37.2 °C (98.9 °F)   Resp 16   Ht 1.651 m (5' 5\")   Wt 84.9 kg (187 lb 3.2 oz)   LMP 01/01/2016   SpO2 98%   Breastfeeding? No   BMI 31.15 kg/m²      Chronic pain recheck:   Last dose of controlled substance: today in AM   Chronic pain treated with Norco 7.5 mg  taken 3 times a day    She  reports that she drinks about 0.6 oz of alcohol per week .  She  reports that she does not use drugs.    Consequences of Chronic Opiate therapy:  (5 A's)  Analgesia: Compared to no treatment or prior treatment, pain is currently " improved  Activity: improved  Adverse Events: She denies constipation, dry mouth, itchy skin, nausea and sedation  Aberrant Behaviors: She reports she is taking medication as prescribed and is not veering from agreed treatment regimen. There have been no inappropriate refills or lost/stolen meds reported.   Affect/Mood: Pain is impacting patient's mood.  Patient reports depression/anxiety.    Nonnarcotic treatments that are being used: tricyclic antidepressants.   Treatment goals discussed.    Opioid Risk Score: 2    Interpretation of Opioid Risk Score   Score 0-3 = Low risk of abuse. Do UDS at least once per year.  Score 4-7 = Moderate risk of abuse. Do UDS 1-4 times per year.  Score 8+ = High risk of abuse. Refer to specialist.    Last order of CONTROLLED SUBSTANCE TREATMENT AGREEMENT was found on 8/30/2017 from Office Visit on 8/30/2017     UDS Summary                URINE DRUG SCREEN Next Due 8/25/2018      Done 8/30/2017 [facility] PAIN MANAGEMENT SCREEN     Patient has more history with this topic...        Most recent UDS reviewed today and is consistent with prescribed medications.    I have reviewed the medical records, the Prescription Monitoring Program and I have determined that controlled substance treatment is medically indicated.        Sore throat  Patient complains of a few days of sore throat and sinus pressure. She brought it to my attention distention of her ears and throat checked as she does have a history of atypical strep infections in the past. No fever no chills no rash and no one is ill at home.        Allergies:Patient has no known allergies.    Current Outpatient Prescriptions Ordered in [facility]   Medication Sig Dispense Refill   • [START ON 12/29/2017] hydrocodone-acetaminophen (NORCO) 7.5-325 MG per tablet Take 1 Tab by mouth 3 times a day as needed for up to 30 days. 90 Tab 0   • [START ON 1/29/2018] hydrocodone-acetaminophen (NORCO) 7.5-325 MG per tablet Take 1 Tab by mouth 3 times a  "day as needed for up to 30 days. 90 Tab 0   • hydrocodone-acetaminophen (NORCO) 7.5-325 MG per tablet Take 1 Tab by mouth 3 times a day as needed for up to 30 days. 90 Tab 0   • ibuprofen (MOTRIN) 800 MG Tab TAKE 1 TABLET BY MOUTH EVERY 8 HOURS 90 Tab 0   • citalopram (CELEXA) 40 MG Tab TAKE 1 TABLET DAILY 90 Tab 0   • zolpidem (AMBIEN) 5 MG Tab Take 1 Tab by mouth at bedtime as needed for Sleep (insomnia). Take 1 tab at bedtime as needed for insomnia. 30 Tab 0     No current Epic-ordered facility-administered medications on file.        Past Medical History:   Diagnosis Date   • Anesthesia     PONV   • Back pain    • Depression    • Disc disorder    • Pain 12/15/2015    left hip and buttock       Social History   Substance Use Topics   • Smoking status: Never Smoker   • Smokeless tobacco: Never Used   • Alcohol use 0.6 oz/week     1 Glasses of wine per week      Comment: 1-2 [per month       Family Status   Relation Status   • Mother Alive   • Father    • Paternal Grandmother    • Paternal Grandfather    •     •       Family History   Problem Relation Age of Onset   • Heart Disease Mother    • Heart Disease Father    • Heart Disease Paternal Grandmother    • Heart Disease Paternal Grandfather    • Diabetes     • Hypertension         ROS: As documented in my HPI      Exam:  Blood pressure 116/84, pulse (!) 110, temperature 37.2 °C (98.9 °F), resp. rate 16, height 1.651 m (5' 5\"), weight 84.9 kg (187 lb 3.2 oz), last menstrual period 2016, SpO2 98 %, not currently breastfeeding.  General:  Well nourished, well developed female in NAD  Head: Nontender scalp. No lesions  HEENT: Eyes conjunctiva is clear, lids without ptosis, pupils equal round and reactive to light and accommodation.  Ears normal shape and contour, canals are clear bilaterally, TMs with good light reflex and appear normal.  Nasal mucosa pale and edematous with clear rhinorrhea.  Oropharynx slight redness.  Sinuses (frontal and maxillary) " nontender to palpation.  Neck: Supple. Symmetric Thyroid palpated. No bruits  Pulmonary:  Normal effort. No rales, ronchi, or wheezing.  Cardiovascular: Regular rate and rhythm without murmur.   Abdomen: Soft nontender, normal bowel sounds  Extremities: no clubbing, cyanosis, or edema.  Left hip and tronchanter: sore to touch and movement.   SI joint tender.     Neurological: No focal deficits    Please note that this dictation was created using voice recognition software. I have made every reasonable attempt to correct obvious errors, but I expect that there are errors of grammar and possibly content that I did not discover before finalizing the note.    Assessment/Plan:  1. Arthralgia of hip, unspecified laterality   fair control with pain medications.    2. Right hip pain  hydrocodone-acetaminophen (NORCO) 7.5-325 MG per tablet               3. Chronic use of opiate drugs therapeutic purposes  hydrocodone-acetaminophen (NORCO) 7.5-325 MG per tablet               4. Sore throat   normal exam today; self care measures.  Call on Friday if any problems with symptoms and I will swab throat.

## 2018-01-17 ENCOUNTER — TELEPHONE (OUTPATIENT)
Dept: PULMONOLOGY | Facility: HOSPICE | Age: 33
End: 2018-01-17

## 2018-01-17 DIAGNOSIS — G47.33 OSA (OBSTRUCTIVE SLEEP APNEA): ICD-10-CM

## 2018-01-17 DIAGNOSIS — F51.04 CHRONIC INSOMNIA: Primary | ICD-10-CM

## 2018-01-17 RX ORDER — ZOLPIDEM TARTRATE 5 MG/1
5 TABLET ORAL NIGHTLY PRN
Qty: 30 TAB | Refills: 0 | OUTPATIENT
Start: 2018-01-17 | End: 2018-02-16

## 2018-01-17 NOTE — TELEPHONE ENCOUNTER
PT called asking for OPO results. It is scanned to [employer].    Also pt was interested in receiving Ambien 10 MG instead of her normal dose of 5 MG? I let her know that this might not be an option but would ask anyway.      LOV: 11/3/2017 Megan ARMSTRONG    NOV: 5/31/2018 Megan ARMSTRONG

## 2018-01-18 RX ORDER — ZOLPIDEM TARTRATE 5 MG/1
5 TABLET ORAL NIGHTLY PRN
Qty: 30 TAB | Refills: 1 | Status: SHIPPED
Start: 2018-01-18 | End: 2018-02-17

## 2018-01-18 NOTE — TELEPHONE ENCOUNTER
OPO shows excellent saturations    I would not recommend increase in Ambien at this time. If patient is still having insomnia I would recommend referral to Dr. Mayi East for CBT

## 2018-01-18 NOTE — TELEPHONE ENCOUNTER
Have we ever prescribed this med? Yes.  If yes, what date? 10/25/2017 ROBERT APRN    Last OV: 11/03/2017 Megan ARMSTRONG    Next OV: 5/3/2018 - Megan ARMSTRONG    DX: INSOMNIA    Medications: ambien 5 MG      Also she is interested in a referral to Dr. East's office. Gave pt the phone number and information.

## 2018-02-16 DIAGNOSIS — F32.9 REACTIVE DEPRESSION: ICD-10-CM

## 2018-02-16 DIAGNOSIS — F41.9 ANXIETY: ICD-10-CM

## 2018-02-16 RX ORDER — CITALOPRAM 40 MG/1
TABLET ORAL
Qty: 90 TAB | Refills: 0 | Status: SHIPPED | OUTPATIENT
Start: 2018-02-16 | End: 2018-05-10

## 2018-03-01 ENCOUNTER — OFFICE VISIT (OUTPATIENT)
Dept: MEDICAL GROUP | Facility: PHYSICIAN GROUP | Age: 33
End: 2018-03-01
Payer: COMMERCIAL

## 2018-03-01 VITALS
HEIGHT: 65 IN | BODY MASS INDEX: 29.92 KG/M2 | TEMPERATURE: 98.7 F | RESPIRATION RATE: 20 BRPM | DIASTOLIC BLOOD PRESSURE: 74 MMHG | OXYGEN SATURATION: 97 % | WEIGHT: 179.6 LBS | HEART RATE: 118 BPM | SYSTOLIC BLOOD PRESSURE: 118 MMHG

## 2018-03-01 DIAGNOSIS — Z79.891 CHRONIC USE OF OPIATE DRUGS THERAPEUTIC PURPOSES: ICD-10-CM

## 2018-03-01 DIAGNOSIS — M25.559 ARTHRALGIA OF HIP, UNSPECIFIED LATERALITY: ICD-10-CM

## 2018-03-01 PROCEDURE — 99213 OFFICE O/P EST LOW 20 MIN: CPT | Performed by: NURSE PRACTITIONER

## 2018-03-01 RX ORDER — HYDROCODONE BITARTRATE AND ACETAMINOPHEN 7.5; 325 MG/1; MG/1
1 TABLET ORAL EVERY 8 HOURS PRN
Qty: 90 TAB | Refills: 0 | Status: SHIPPED | OUTPATIENT
Start: 2018-05-02 | End: 2018-06-04 | Stop reason: SDUPTHER

## 2018-03-01 RX ORDER — HYDROCODONE BITARTRATE AND ACETAMINOPHEN 7.5; 325 MG/1; MG/1
1 TABLET ORAL EVERY 8 HOURS PRN
Qty: 90 TAB | Refills: 0 | Status: SHIPPED | OUTPATIENT
Start: 2018-04-01 | End: 2018-03-01 | Stop reason: SDUPTHER

## 2018-03-01 RX ORDER — HYDROCODONE BITARTRATE AND ACETAMINOPHEN 7.5; 325 MG/1; MG/1
1-2 TABLET ORAL EVERY 6 HOURS PRN
COMMUNITY
End: 2018-03-01 | Stop reason: SDUPTHER

## 2018-03-01 RX ORDER — HYDROCODONE BITARTRATE AND ACETAMINOPHEN 7.5; 325 MG/1; MG/1
1 TABLET ORAL EVERY 8 HOURS PRN
Qty: 90 TAB | Refills: 0 | Status: SHIPPED | OUTPATIENT
Start: 2018-03-01 | End: 2018-03-01 | Stop reason: SDUPTHER

## 2018-03-01 NOTE — ASSESSMENT & PLAN NOTE
Chronic pain recheck:   Last dose of controlled substance: 4 days ago  Chronic pain treated with Norco taken sometimes 2 to 3 times a day, sometimes will not take for a day or so.    She  reports that she drinks alcohol.  She  reports that she does not use drugs.    Consequences of Chronic Opiate therapy:  (5 A's)  Analgesia: Compared to no treatment or prior treatment, pain is currently improved.  Reports pain is always at least a 3 out of 10.  Can get up to a 7 out of 10.  Activity: Improved  Adverse Events: She denies constipation, itchy skin, decreased mentation.  Aberrant Behaviors: She reports she is taking medication as prescribed and is not veering from agreed treatment regimen. There have been no inappropriate refills or lost/stolen meds reported.   Affect/Mood: Pain is not impacting patient's mood.     Nonnarcotic treatments that are being used: NSAIDs/HOLLINGSWORTH-2.   Treatment goals discussed.    Opioid Risk Score: 2    Interpretation of Opioid Risk Score   Score 0-3 = Low risk of abuse. Do UDS at least once per year.  Score 4-7 = Moderate risk of abuse. Do UDS 1-4 times per year.  Score 8+ = High risk of abuse. Refer to specialist.    Last order of CONTROLLED SUBSTANCE TREATMENT AGREEMENT was found on 8/30/2017 from Office Visit on 8/30/2017     UDS Summary                URINE DRUG SCREEN Next Due 8/25/2018      Done 8/30/2017 [facility] PAIN MANAGEMENT SCREEN     Patient has more history with this topic...        Most recent UDS reviewed today and  consistent with prescribed medications.  Hydrocodone was missing from patient's last UDS.  Will recheck UDS today.    I have reviewed the medical records, the Prescription Monitoring Program and I have determined that controlled substance treatment is medically indicated.

## 2018-03-01 NOTE — ASSESSMENT & PLAN NOTE
"Patient reports that she has been referred to an SI joint specialist by her orthopedic physician. She is somewhat hesitant to pursue this as she is \"tired of being poked.\" She does say that she intends to make appointment soon. Patient reports that she has had multiple surgeries on her left hip. Last surgery was 12/2015. She has also attended physical therapy.    "

## 2018-03-01 NOTE — PROGRESS NOTES
"  CC:  Chronic hip pain management    HISTORY OF THE PRESENT ILLNESS: Patient is a 33 y.o. female. This pleasant patient is here today for management of chronic hip pain.      Hip pain  Patient reports that she has been referred to an SI joint specialist by her orthopedic physician. She is somewhat hesitant to pursue this as she is \"tired of being poked.\" She does say that she intends to make appointment soon. Patient reports that she has had multiple surgeries on her left hip. Last surgery was 12/2015. She has also attended physical therapy.      Chronic use of opiate drugs therapeutic purposes  Chronic pain recheck:   Last dose of controlled substance: 4 days ago  Chronic pain treated with Norco taken sometimes 2 to 3 times a day, sometimes will not take for a day or so.    She  reports that she drinks alcohol.  She  reports that she does not use drugs.    Consequences of Chronic Opiate therapy:  (5 A's)  Analgesia: Compared to no treatment or prior treatment, pain is currently improved.  Reports pain is always at least a 3 out of 10.  Can get up to a 7 out of 10.  Activity: Improved  Adverse Events: She denies constipation, itchy skin, decreased mentation.  Aberrant Behaviors: She reports she is taking medication as prescribed and is not veering from agreed treatment regimen. There have been no inappropriate refills or lost/stolen meds reported.   Affect/Mood: Pain is not impacting patient's mood.     Nonnarcotic treatments that are being used: NSAIDs/HOLLINGSWORTH-2.   Treatment goals discussed.    Opioid Risk Score: 2    Interpretation of Opioid Risk Score   Score 0-3 = Low risk of abuse. Do UDS at least once per year.  Score 4-7 = Moderate risk of abuse. Do UDS 1-4 times per year.  Score 8+ = High risk of abuse. Refer to specialist.    Last order of CONTROLLED SUBSTANCE TREATMENT AGREEMENT was found on 8/30/2017 from Office Visit on 8/30/2017     UDS Summary                URINE DRUG SCREEN Next Due 8/25/2018      Done " 8/30/2017 [facility] PAIN MANAGEMENT SCREEN     Patient has more history with this topic...        Most recent UDS reviewed today and inconsistent with prescribed medications.  Hydrocodone was missing from patient's last UDS.  Will recheck UDS today.    I have reviewed the medical records, the Prescription Monitoring Program and I have determined that controlled substance treatment is medically indicated.        Allergies: Patient has no known allergies.    Current Outpatient Prescriptions Ordered in [facility]   Medication Sig Dispense Refill   • [START ON 5/2/2018] HYDROcodone-acetaminophen (NORCO) 7.5-325 MG per tablet Take 1 Tab by mouth every 8 hours as needed for up to 30 days. 90 Tab 0   • ibuprofen (MOTRIN) 800 MG Tab TAKE 1 TABLET BY MOUTH EVERY 8 HOURS 90 Tab 0   • citalopram (CELEXA) 40 MG Tab TAKE 1 TABLET DAILY 90 Tab 0   • zolpidem (AMBIEN) 5 MG Tab Take 1 Tab by mouth at bedtime as needed for Sleep (insomnia). Take 1 tab at bedtime as needed for insomnia. 30 Tab 0     No current Epic-ordered facility-administered medications on file.        Past Medical History:   Diagnosis Date   • Anesthesia     PONV   • Back pain    • Depression    • Disc disorder    • Pain 12/15/2015    left hip and buttock       Past Surgical History:   Procedure Laterality Date   • HIP ARTHROSCOPY Left 12/17/2015    Procedure: HIP ARTHROSCOPY bursectomy, gluteal eval, osteoplasty, labral debridement, chondroplasty, femoral neck decompression, IT band release;  Surgeon: Obdulio Frankel M.D.;  Location: SURGERY [facility];  Service:    • DENTAL EXTRACTION(S)  2003    wisdom teeth       Social History   Substance Use Topics   • Smoking status: Never Smoker   • Smokeless tobacco: Never Used   • Alcohol use 0.0 oz/week       Family History   Problem Relation Age of Onset   • Heart Disease Mother    • Heart Disease Father    • Heart Disease Paternal Grandmother    • Heart Disease Paternal Grandfather    • Diabetes     •  "Hypertension         ROS:  Denies chest pain, abdominal pain, and shortness of breath.      Exam: Blood pressure 118/74, pulse (!) 118, temperature 37.1 °C (98.7 °F), resp. rate 20, height 1.651 m (5' 5\"), weight 81.5 kg (179 lb 9.6 oz), last menstrual period 01/30/2018, SpO2 97 %, not currently breastfeeding. Body mass index is 29.89 kg/m².    General: Pleasant, alert, well nourished, well developed female in NAD  Pulmonary: Clear to ausculation.  Normal effort. No rales, ronchi, or wheezing.  Cardiovascular: Normal rate and rhythm without murmur.   Musculoskeletal: Normal gait.   Psych: Normal mood and affect. Alert and oriented. Judgment and insight is normal.    Please note that this dictation was created using voice recognition software. I have made every reasonable attempt to correct obvious errors, but I expect that there are errors of grammar and possibly content that I did not discover before finalizing the note.      Assessment/Plan  1. Arthralgia of hip, unspecified laterality  Continue Norco for pain.  Follow up with SI specialist.  Try taking less Norco.  - HYDROcodone-acetaminophen (NORCO) 7.5-325 MG per tablet; Take 1 Tab by mouth every 8 hours as needed for up to 30 days.  Dispense: 90 Tab; Refill: 0    2. Chronic use of opiate drugs therapeutic purposes  Consent reviewed with and signed by patient.  - Consent for Opiate Prescription  - [facility] PAIN MANAGEMENT SCREEN; Future    This patient is continuing to use a controlled substance (CS) on a long term basis.  The patient is thoroughly aware of the goals of treatment with the CS  The patient is aware that yearly and random urine drug screens are required.  The patient has been instructed to take the CS only as prescribed.  The patient is prohibited from sharing the CS with any other person.  The patient is instructed to inform the provider if any other CS is taken, of any alcohol or cannabis or other recreational drug use, any treatment for side " effects of the CS or complications, if they have CS active rx in other states  The patient has evidence for a reason for the CS  The treatment plan has been discussed with the patient  The  report has been reviewed    Patient will return in 3 months for pain management or sooner for other concerns if needed.       Alert-The patient is alert, awake and responds to voice. The patient is oriented to time, place, and person. The triage nurse is able to obtain subjective information.

## 2018-03-16 DIAGNOSIS — G47.00 INSOMNIA, UNSPECIFIED TYPE: ICD-10-CM

## 2018-03-19 RX ORDER — ZOLPIDEM TARTRATE 5 MG/1
TABLET ORAL
Qty: 30 TAB | Refills: 2 | Status: SHIPPED
Start: 2018-03-19 | End: 2018-06-04

## 2018-03-19 NOTE — TELEPHONE ENCOUNTER
Have we ever prescribed this med? Yes.  If yes, what date? 10/25/17    Last Ov: 11/03/17    Next OV: 05/03/18    DX: insomnia    Medications: zolpidem (AMBIEN)

## 2018-03-20 NOTE — TELEPHONE ENCOUNTER
I spoke to Giorgi at   [facility] [id number] - [place], [place] [address]  [facility] [address] [facility] [address]  [address] [facility] [address]  [place] [address]  Phone: [phone] Fax: [phone]    RX called in

## 2018-05-03 ENCOUNTER — SLEEP CENTER VISIT (OUTPATIENT)
Dept: SLEEP MEDICINE | Facility: MEDICAL CENTER | Age: 33
End: 2018-05-03
Payer: COMMERCIAL

## 2018-05-03 VITALS
HEIGHT: 65 IN | OXYGEN SATURATION: 93 % | DIASTOLIC BLOOD PRESSURE: 70 MMHG | SYSTOLIC BLOOD PRESSURE: 128 MMHG | HEART RATE: 113 BPM | RESPIRATION RATE: 15 BRPM | WEIGHT: 170 LBS | BODY MASS INDEX: 28.32 KG/M2

## 2018-05-03 DIAGNOSIS — G47.33 OSA (OBSTRUCTIVE SLEEP APNEA): ICD-10-CM

## 2018-05-03 DIAGNOSIS — G47.00 INSOMNIA, UNSPECIFIED TYPE: ICD-10-CM

## 2018-05-03 PROCEDURE — 99213 OFFICE O/P EST LOW 20 MIN: CPT | Performed by: NURSE PRACTITIONER

## 2018-05-03 NOTE — PROGRESS NOTES
Chief Complaint   Patient presents with   • Follow-Up     6 M    • Apnea         HPI: This patient is a 33 y.o. female, who presents for six-month follow-up of BABAK.   Medical history includes obesity, chronic hip pain, anxiety disorder, insomnia.     PSG indicates moderate sleep apnea with an AHI of 22.5, minimum oxygen saturation of 92 %. Her events were primarily hypopneas. She was initiated on auto CPAP 5-15 cm H2O. Compliance download over the past 30 days indicates 100 % compliance, average use of 8 hours 29 minute per night, AHI of one-point. Mask and pressures are comfortable.  She does benefit from therapy, although does not feel as rested as she would like she was referred to Mayi East for Insomnia. She has not followed up with this but is willing to do so. She continues to have periods of insomnia which she feels contributes to her fatigue.  She falls asleep fairly quickly but typically wakes up around midnight and has a difficult time falling back asleep.  She says this will occur for 4-5 nights in a row and then stop for a certain period of time.  She denies resuscitative gasps or snorts.  She denies a.m. headache.    Past Medical History:   Diagnosis Date   • Anesthesia     PONV   • Back pain    • Depression    • Disc disorder    • Pain 12/15/2015    left hip and buttock       Social History   Substance Use Topics   • Smoking status: Never Smoker   • Smokeless tobacco: Never Used   • Alcohol use 0.0 oz/week       Family History   Problem Relation Age of Onset   • Heart Disease Mother    • Heart Disease Father    • Heart Disease Paternal Grandmother    • Heart Disease Paternal Grandfather    • Diabetes     • Hypertension         Current medications as of today   Current Outpatient Prescriptions   Medication Sig Dispense Refill   • HYDROcodone-acetaminophen (NORCO) 7.5-325 MG per tablet Take 1 Tab by mouth every 8 hours as needed for up to 30 days. 90 Tab 0   • ibuprofen (MOTRIN) 800 MG Tab TAKE 1  "TABLET BY MOUTH EVERY 8 HOURS 90 Tab 0   • citalopram (CELEXA) 40 MG Tab TAKE 1 TABLET DAILY 90 Tab 0     No current facility-administered medications for this visit.        Allergies: Patient has no known allergies.    Blood pressure 128/70, pulse (!) 113, resp. rate 15, height 1.651 m (5' 5\"), weight 77.1 kg (170 lb), SpO2 93 %.      ROS: As per HPI and otherwise negative if not stated.      Physical exam:   Constitutional: Well-nourished, well-developed, in no acute distress  Eyes: PERRL  Neck: supple, trachea midline  Respiratory: no intercostal retractions or accessory muscle use   Musculoskeletal: no clubbing or cyanosis  Skin: No rashes or lesions noted on exposed skin  Neuro: No focal deficit noted  Psychiatric: Oriented to time, person and place.     Diagnosis:  1. BABAK (obstructive sleep apnea)  DME MASK AND SUPPLIES   2. Insomnia, unspecified type  REFERRAL TO OTHER       Plan:  1.  Continue CPAP at night  2.  Clean mask and tubing weekly  3.  Referral to Dr. East for insomnia.  Sleep hygiene handout provided  4.  Follow-up annually for compliance download, sooner if needed  5.  Rx for supplies to DME    "

## 2018-05-10 DIAGNOSIS — F32.9 REACTIVE DEPRESSION: ICD-10-CM

## 2018-05-10 DIAGNOSIS — F41.9 ANXIETY: ICD-10-CM

## 2018-05-10 RX ORDER — CITALOPRAM 40 MG/1
40 TABLET ORAL DAILY
Qty: 90 TAB | Refills: 0 | Status: SHIPPED | OUTPATIENT
Start: 2018-05-10 | End: 2018-06-04

## 2018-06-04 ENCOUNTER — OFFICE VISIT (OUTPATIENT)
Dept: MEDICAL GROUP | Facility: PHYSICIAN GROUP | Age: 33
End: 2018-06-04
Payer: COMMERCIAL

## 2018-06-04 VITALS
OXYGEN SATURATION: 98 % | WEIGHT: 179.2 LBS | HEIGHT: 65 IN | HEART RATE: 80 BPM | TEMPERATURE: 98.2 F | RESPIRATION RATE: 16 BRPM | SYSTOLIC BLOOD PRESSURE: 116 MMHG | BODY MASS INDEX: 29.85 KG/M2 | DIASTOLIC BLOOD PRESSURE: 72 MMHG

## 2018-06-04 DIAGNOSIS — G89.29 OTHER CHRONIC PAIN: ICD-10-CM

## 2018-06-04 DIAGNOSIS — M25.559 ARTHRALGIA OF HIP, UNSPECIFIED LATERALITY: ICD-10-CM

## 2018-06-04 DIAGNOSIS — Z79.891 CHRONIC USE OF OPIATE DRUGS THERAPEUTIC PURPOSES: ICD-10-CM

## 2018-06-04 PROCEDURE — 99214 OFFICE O/P EST MOD 30 MIN: CPT | Performed by: FAMILY MEDICINE

## 2018-06-04 RX ORDER — HYDROCODONE BITARTRATE AND ACETAMINOPHEN 7.5; 325 MG/1; MG/1
1 TABLET ORAL EVERY 8 HOURS PRN
Qty: 90 TAB | Refills: 0 | Status: SHIPPED | OUTPATIENT
Start: 2018-06-04 | End: 2018-06-04 | Stop reason: SDUPTHER

## 2018-06-04 RX ORDER — HYDROCODONE BITARTRATE AND ACETAMINOPHEN 7.5; 325 MG/1; MG/1
1 TABLET ORAL EVERY 8 HOURS PRN
Qty: 90 TAB | Refills: 0 | Status: SHIPPED | OUTPATIENT
Start: 2018-08-04 | End: 2018-08-27 | Stop reason: SDUPTHER

## 2018-06-04 RX ORDER — HYDROCODONE BITARTRATE AND ACETAMINOPHEN 7.5; 325 MG/1; MG/1
1 TABLET ORAL EVERY 8 HOURS PRN
Qty: 90 TAB | Refills: 0 | Status: SHIPPED | OUTPATIENT
Start: 2018-07-04 | End: 2018-06-04 | Stop reason: SDUPTHER

## 2018-06-04 NOTE — ASSESSMENT & PLAN NOTE
Chronic pain for 7-8 years  Was evaluated by orthopedic specialist and had evaluation with a spine specialist, did more injections and PT and had hip surgery 2.5 years ago done to have bursectomy, IT band release, repair of torn ligaments around joint and femoral head shaved down. 15% pain improved after surgery  Still has chronic pain radiating down from top of left buttock down side of left leg.   Was recommended to see an SI joint specialist.     She takes ibuprofen for pain also takes Norco about 3-4 a day on days when pain is back, also has days when she does not take any.   Pain can get bad as 7 and with pain medication pain gets dull but constant pain.     Pain does wake her at night and causes insomnia  She does not drink alcohol or use marijuana.   No driving with narcotic  Advised on sedative and respiratory depression.     Refill done for 3 months.

## 2018-06-04 NOTE — PROGRESS NOTES
"Subjective:   Joyce Cintron is a 33 y.o. female here today for evaluation and management of:     Chronic use of opiate drugs therapeutic purposes  Chronic pain for 7-8 years  Was evaluated by orthopedic specialist and had evaluation with a spine specialist, did more injections and PT and had hip surgery done to have bursectomy, IT band release, repair of torn ligaments around joint and femoral head shaved down. 15% pain improved after surgery  Still has chronic pain radiating down from top of left buttock down side of left leg.   Was recommended to see an SI joint specialist.     She takes ibuprofen for pain also takes Norco about 3-4 a day on days when pain is back, also has days when she does not take any.   Pain can get bad as 7 and with pain medication pain gets dull but constant pain.     Pain does wake her at night and causes insomnia  She does not drink alcohol or use marijuana.   No driving with narcotic  Advised on sedative and respiratory depression.     Refill done for 3 months.          Current medicines (including changes today)  Current Outpatient Prescriptions   Medication Sig Dispense Refill   • [START ON 8/4/2018] HYDROcodone-acetaminophen (NORCO) 7.5-325 MG per tablet Take 1 Tab by mouth every 8 hours as needed for up to 30 days. 90 Tab 0   • zolpidem (AMBIEN) 5 MG Tab TAKE 1 TABLET BY MOUTH EVERY DAY AT BEDTIME AS NEEDED SLEEP 30 Tab 2   • ibuprofen (MOTRIN) 800 MG Tab TAKE 1 TABLET BY MOUTH EVERY 8 HOURS 90 Tab 0     No current facility-administered medications for this visit.      She  has a past medical history of Anesthesia; Back pain; Depression; Disc disorder; and Pain (12/15/2015).    ROS  No chest pain, no shortness of breath, no abdominal pain       Objective:     Blood pressure 116/72, pulse 80, temperature 36.8 °C (98.2 °F), resp. rate 16, height 1.651 m (5' 5\"), weight 81.3 kg (179 lb 3.2 oz), SpO2 98 %. Body mass index is 29.82 kg/m².   Physical " Exam:  Constitutional: Alert, no distress.  Skin: Warm, dry, good turgor, no rashes in visible areas.  Eye: Equal, round and reactive, conjunctiva clear, lids normal.  ENMT: Lips without lesions, good dentition, oropharynx clear.  Neck: Trachea midline, no masses, no thyromegaly. No cervical or supraclavicular lymphadenopathy  Respiratory: Unlabored respiratory effort, lungs clear to auscultation, no wheezes, no ronchi.  Cardiovascular: Normal S1, S2, no murmur, no edema.  Abdomen: Soft, non-tender, no masses, no hepatosplenomegaly.  Psych: Alert and oriented x3, normal affect and mood.        Assessment and Plan:   The following treatment plan was discussed    1. Other chronic pain  Advised to minimize use of opioid pain medication  - Controlled Substance Treatment Agreement    2. Arthralgia of hip, unspecified laterality  Worsening will refer to ortho SI joint specialist.   - HYDROcodone-acetaminophen (NORCO) 7.5-325 MG per tablet; Take 1 Tab by mouth every 8 hours as needed for up to 30 days.  Dispense: 90 Tab; Refill: 0    3. Chronic use of opiate drugs therapeutic purposes  - CONSENT FOR OPIATE PRESCRIPTION      Followup: No Follow-up on file.

## 2018-06-20 DIAGNOSIS — G47.00 INSOMNIA, UNSPECIFIED TYPE: ICD-10-CM

## 2018-06-21 ENCOUNTER — TELEPHONE (OUTPATIENT)
Dept: PULMONOLOGY | Facility: HOSPICE | Age: 33
End: 2018-06-21

## 2018-06-21 RX ORDER — ZOLPIDEM TARTRATE 5 MG/1
TABLET ORAL
Qty: 30 TAB | Refills: 2 | Status: SHIPPED
Start: 2018-06-21 | End: 2018-07-21

## 2018-06-21 NOTE — TELEPHONE ENCOUNTER
Have we ever prescribed this med? Yes.  If yes, what date? 03/19/18    Last OV: 05/03/2018    Next OV: ANNUAL FV    DX: Insomnia    Medications: Zolpidem 5mg    [facility]

## 2018-06-21 NOTE — TELEPHONE ENCOUNTER
Mayi East is not in the patients ins network. I advised that there isn't anyone else. She will contact her insurance to see if she can see Dr. East out of network or if they have someone else in mind/ap

## 2018-08-03 ENCOUNTER — TELEPHONE (OUTPATIENT)
Dept: PULMONOLOGY | Facility: HOSPICE | Age: 33
End: 2018-08-03

## 2018-08-03 NOTE — TELEPHONE ENCOUNTER
The patient called and she is going on an international flight on [employer].  She needs a letter from us typed that says she needs to use her CPAP machine during the flight.  Her setting is 5-15 cm H20.  They don't have a form for that that we can just fill out. I can do the letter if needed.  Please advise, thank you.

## 2018-08-03 NOTE — LETTER
August 3, 2018        Joyce Cintron is under our care for obstructive sleep apnea.  She requires use of a CPAP machine while sleeping. She will require her machine for travel. Please call if you have questions.     Bernice ARMSTRONG, MSN, FNP-BC

## 2018-08-13 DIAGNOSIS — F41.9 ANXIETY: ICD-10-CM

## 2018-08-13 DIAGNOSIS — F32.9 REACTIVE DEPRESSION: ICD-10-CM

## 2018-08-14 RX ORDER — CITALOPRAM 40 MG/1
TABLET ORAL
Qty: 90 TAB | Refills: 3 | Status: SHIPPED | OUTPATIENT
Start: 2018-08-14 | End: 2018-08-27

## 2018-08-14 NOTE — TELEPHONE ENCOUNTER
Dr Stu Bolanos was d/c'd by you 6/18 but I'm not seeing a note as to why. Pt is now requesting. Please refill as you see fit.

## 2018-08-27 ENCOUNTER — OFFICE VISIT (OUTPATIENT)
Dept: MEDICAL GROUP | Facility: PHYSICIAN GROUP | Age: 33
End: 2018-08-27
Payer: COMMERCIAL

## 2018-08-27 ENCOUNTER — TELEPHONE (OUTPATIENT)
Dept: MEDICAL GROUP | Facility: PHYSICIAN GROUP | Age: 33
End: 2018-08-27

## 2018-08-27 VITALS
OXYGEN SATURATION: 99 % | HEART RATE: 74 BPM | SYSTOLIC BLOOD PRESSURE: 116 MMHG | RESPIRATION RATE: 16 BRPM | BODY MASS INDEX: 28.66 KG/M2 | HEIGHT: 65 IN | WEIGHT: 172 LBS | DIASTOLIC BLOOD PRESSURE: 60 MMHG | TEMPERATURE: 98.2 F

## 2018-08-27 DIAGNOSIS — Z79.891 CHRONIC USE OF OPIATE DRUGS THERAPEUTIC PURPOSES: ICD-10-CM

## 2018-08-27 DIAGNOSIS — M25.559 ARTHRALGIA OF HIP, UNSPECIFIED LATERALITY: ICD-10-CM

## 2018-08-27 PROCEDURE — 99214 OFFICE O/P EST MOD 30 MIN: CPT | Performed by: FAMILY MEDICINE

## 2018-08-27 RX ORDER — HYDROCODONE BITARTRATE AND ACETAMINOPHEN 7.5; 325 MG/1; MG/1
1 TABLET ORAL EVERY 8 HOURS PRN
Qty: 90 TAB | Refills: 0 | Status: SHIPPED | OUTPATIENT
Start: 2018-08-27 | End: 2018-08-27 | Stop reason: SDUPTHER

## 2018-08-27 RX ORDER — HYDROCODONE BITARTRATE AND ACETAMINOPHEN 7.5; 325 MG/1; MG/1
1 TABLET ORAL EVERY 8 HOURS PRN
Qty: 90 TAB | Refills: 0 | Status: SHIPPED | OUTPATIENT
Start: 2018-10-27 | End: 2018-11-26 | Stop reason: SDUPTHER

## 2018-08-27 RX ORDER — HYDROCODONE BITARTRATE AND ACETAMINOPHEN 7.5; 325 MG/1; MG/1
1 TABLET ORAL EVERY 8 HOURS PRN
Qty: 90 TAB | Refills: 0 | Status: SHIPPED | OUTPATIENT
Start: 2018-09-27 | End: 2018-08-27

## 2018-08-27 ASSESSMENT — PATIENT HEALTH QUESTIONNAIRE - PHQ9: CLINICAL INTERPRETATION OF PHQ2 SCORE: 0

## 2018-08-27 NOTE — ASSESSMENT & PLAN NOTE
Chronic hip and back pain   Was advised to see Dr. Castillo orthopedic specialist by her provider at [facility] orthopedics as there was nothing more they could do for her pain.

## 2018-08-27 NOTE — ASSESSMENT & PLAN NOTE
Chronic back pain and hip pain. Had extensive surgery done on her hip with bursectomy, IT band release, repair of torn ligaments and femoral head shave down. She was seen at [facility] orthopedics and was referred to Dr. Castillo as an SI joint specialist in [facility] but was unable to find his office.     Pain radiates down from left buttock to left leg.   Uses ibuprofen and norco 7.5/325 about 3-4 pills a day.     This patient is continuing to use a controlled substance (CS) on a long term basis.  The patient is thoroughly aware of the goals of treatment with the CS  The patient is aware that yearly and random urine drug screens are required.  The patient has been instructed to take the CS only as prescribed.  The patient is prohibited from sharing the CS with any other person.  The patient is instructed to inform the provider if any other CS is taken, of any alcohol or cannabis or other recreational drug use, any treatment for side effects of the CS or complications, if they have CS active rx in other states  The patient has evidence for a reason for the CS  The treatment plan has been discussed with the patient  The  report has been reviewed

## 2018-08-27 NOTE — TELEPHONE ENCOUNTER
[facility] Only the order is scanned into EMR.   Lana Chase M.D.

## 2018-08-27 NOTE — PROGRESS NOTES
Subjective:   Joyce Cintron is a 33 y.o. female here today for evaluation and management of:     Chronic use of opiate drugs therapeutic purposes  Chronic back pain and hip pain. Had extensive surgery done on her hip with bursectomy, IT band release, repair of torn ligaments and femoral head shave down. She was seen at [facility] orthopedics and was referred to Dr. Castillo as an SI joint specialist in [facility] but was unable to find his office.     Pain radiates down from left buttock to left leg.   Uses ibuprofen and norco 7.5/325 about 3-4 pills a day.     This patient is continuing to use a controlled substance (CS) on a long term basis.  The patient is thoroughly aware of the goals of treatment with the CS  The patient is aware that yearly and random urine drug screens are required.  The patient has been instructed to take the CS only as prescribed.  The patient is prohibited from sharing the CS with any other person.  The patient is instructed to inform the provider if any other CS is taken, of any alcohol or cannabis or other recreational drug use, any treatment for side effects of the CS or complications, if they have CS active rx in other states  The patient has evidence for a reason for the CS  The treatment plan has been discussed with the patient  The  report has been reviewed        Hip pain  Chronic hip and back pain   Was advised to [facility] Dr. Castillo [facility]opedic specialist [facility] was nothing more they could do for her pain.            Current medicines (including changes today)  Current Outpatient Prescriptions   Medication Sig Dispense Refill   • [START ON 10/27/2018] HYDROcodone-acetaminophen (NORCO) 7.5-325 MG per tablet Take 1 Tab by mouth every 8 hours as needed for up to 30 days. 90 Tab 0   • ibuprofen (MOTRIN) 800 MG Tab TAKE 1 TABLET BY MOUTH EVERY 8 HOURS 90 Tab 0     No current facility-administered medications for  "this visit.      She  has a past medical history of Anesthesia; Back pain; Depression; Disc disorder; and Pain (12/15/2015).    ROS  No chest pain, no shortness of breath, no abdominal pain       Objective:     Blood pressure 116/60, pulse 74, temperature 36.8 °C (98.2 °F), resp. rate 16, height 1.651 m (5' 5\"), weight 78 kg (172 lb), SpO2 99 %. Body mass index is 28.62 kg/m².   Physical Exam:  Constitutional: Alert, no distress.  Skin: Warm, dry, good turgor, no rashes in visible areas.  Eye: Equal, round and reactive, conjunctiva clear, lids normal.  ENMT: Lips without lesions, good dentition, oropharynx clear.  Neck: Trachea midline, no masses, no thyromegaly. No cervical or supraclavicular lymphadenopathy  Respiratory: Unlabored respiratory effort, lungs clear to auscultation, no wheezes, no ronchi.  Cardiovascular: Normal S1, S2, no murmur, no edema.  Abdomen: Soft, non-tender, no masses, no hepatosplenomegaly.  Psych: Alert and oriented x3, normal affect and mood.        Assessment and Plan:   The following treatment plan was discussed    1. Chronic use of opiate drugs therapeutic purposes  Refills done for 3 months.   - HYDROcodone-acetaminophen (NORCO) 7.5-325 MG per tablet; Take 1 Tab by mouth every 8 hours as needed for up to 30 days.  Dispense: 90 Tab; Refill: 0    2. Arthralgia of hip, unspecified laterality  - CONSENT FOR OPIATE PRESCRIPTION  - HYDROcodone-acetaminophen (NORCO) 7.5-325 MG per tablet; Take 1 Tab by mouth every 8 hours as needed for up to 30 days.  Dispense: 90 Tab; Refill: 0  - REFERRAL TO ORTHOPEDICS      Followup: No Follow-up on file.         "

## 2018-08-28 ENCOUNTER — TELEPHONE (OUTPATIENT)
Dept: MEDICAL GROUP | Facility: PHYSICIAN GROUP | Age: 33
End: 2018-08-28

## 2018-08-28 NOTE — TELEPHONE ENCOUNTER
Please let patient know that since both UDS 2017 and 2018 do not show any presence of the medication being prescribed (it is prescribed three tabs everyday), it means she is not taking her medication. So I will need to start weaning her down off pain medication. We will discuss this at our next visit.   Lana Chase M.D.

## 2018-09-14 DIAGNOSIS — G47.33 OSA (OBSTRUCTIVE SLEEP APNEA): ICD-10-CM

## 2018-09-17 DIAGNOSIS — G47.00 INSOMNIA, UNSPECIFIED TYPE: ICD-10-CM

## 2018-09-17 RX ORDER — ZOLPIDEM TARTRATE 5 MG/1
TABLET ORAL
COMMUNITY
Start: 2018-08-19 | End: 2018-09-17

## 2018-09-17 RX ORDER — ZOLPIDEM TARTRATE 5 MG/1
TABLET ORAL
Qty: 30 TAB | Refills: 1 | Status: SHIPPED
Start: 2018-09-17 | End: 2018-11-16

## 2018-09-17 RX ORDER — ZOLPIDEM TARTRATE 5 MG/1
5 TABLET ORAL NIGHTLY PRN
Qty: 30 TAB | Refills: 0 | OUTPATIENT
Start: 2018-09-17

## 2018-09-17 NOTE — TELEPHONE ENCOUNTER
[facility]axed to :   [facility] [id number] [facility] [place] [place] [facility] [address]  [facility] [address] [facility] [address] [place] [phone] (Phone)  [phone] (Fax)

## 2018-09-17 NOTE — TELEPHONE ENCOUNTER
Caller Name: Joyce Cintron                 Call Back Number: [phone] (home)         Patient approves a detailed voicemail message: N\A    Have we ever prescribed this med? Yes.  If yes, what date? 8/19/18    Last OV: 5/3/18 PATTI Burdick   Plan:  1.  Continue CPAP at night  2.  Clean mask and tubing weekly  3.  Referral to Dr. East for insomnia.  Sleep hygiene handout provided  4.  Follow-up annually for compliance download, sooner if needed  5.  Rx for supplies to DME    Next OV: no pending appt     DX: vj     Medications:  Current Outpatient Prescriptions   Medication Sig Dispense Refill   • zolpidem (AMBIEN) 5 MG Tab      • [START ON 10/27/2018] HYDROcodone-acetaminophen (NORCO) 7.5-325 MG per tablet Take 1 Tab by mouth every 8 hours as needed for up to 30 days. 90 Tab 0   • ibuprofen (MOTRIN) 800 MG Tab TAKE 1 TABLET BY MOUTH EVERY 8 HOURS 90 Tab 0     No current facility-administered medications for this visit.

## 2018-09-17 NOTE — TELEPHONE ENCOUNTER
Was the patient seen in the last year in this department? Yes    Does patient have an active prescription for medications requested? Yes    Received Request Via: Pharmacy       Last OV: 5/3/18 with PATTI King     Next OV: No pending appointment    DX: Insomnia    Last Refill: 08/19/2018.

## 2018-09-18 NOTE — TELEPHONE ENCOUNTER
Faxed:   [facility] [address] - [place] [facility] [address]  [facility] [address] [facility] [address] [place] [phone] (Phone)  [phone] (Fax)

## 2018-11-26 ENCOUNTER — OFFICE VISIT (OUTPATIENT)
Dept: MEDICAL GROUP | Facility: PHYSICIAN GROUP | Age: 33
End: 2018-11-26
Payer: COMMERCIAL

## 2018-11-26 VITALS
SYSTOLIC BLOOD PRESSURE: 116 MMHG | OXYGEN SATURATION: 97 % | BODY MASS INDEX: 28.32 KG/M2 | WEIGHT: 170 LBS | TEMPERATURE: 98.3 F | HEART RATE: 78 BPM | RESPIRATION RATE: 18 BRPM | DIASTOLIC BLOOD PRESSURE: 74 MMHG | HEIGHT: 65 IN

## 2018-11-26 DIAGNOSIS — Z13.220 SCREENING FOR LIPID DISORDERS: ICD-10-CM

## 2018-11-26 DIAGNOSIS — Z82.49 FAMILY HISTORY OF EARLY CAD: ICD-10-CM

## 2018-11-26 DIAGNOSIS — Z79.891 CHRONIC USE OF OPIATE DRUGS THERAPEUTIC PURPOSES: ICD-10-CM

## 2018-11-26 DIAGNOSIS — M25.559 ARTHRALGIA OF HIP, UNSPECIFIED LATERALITY: ICD-10-CM

## 2018-11-26 PROCEDURE — 99214 OFFICE O/P EST MOD 30 MIN: CPT | Performed by: FAMILY MEDICINE

## 2018-11-26 RX ORDER — HYDROCODONE BITARTRATE AND ACETAMINOPHEN 7.5; 325 MG/1; MG/1
1 TABLET ORAL EVERY 8 HOURS PRN
Qty: 85 TAB | Refills: 0 | Status: SHIPPED | OUTPATIENT
Start: 2019-01-26 | End: 2019-02-25

## 2018-11-26 RX ORDER — HYDROCODONE BITARTRATE AND ACETAMINOPHEN 7.5; 325 MG/1; MG/1
1 TABLET ORAL EVERY 8 HOURS PRN
Qty: 85 TAB | Refills: 0 | Status: SHIPPED | OUTPATIENT
Start: 2018-12-26 | End: 2018-11-26 | Stop reason: SDUPTHER

## 2018-11-26 RX ORDER — HYDROCODONE BITARTRATE AND ACETAMINOPHEN 7.5; 325 MG/1; MG/1
1 TABLET ORAL EVERY 8 HOURS PRN
Qty: 85 TAB | Refills: 0 | Status: SHIPPED | OUTPATIENT
Start: 2018-11-26 | End: 2018-11-26 | Stop reason: SDUPTHER

## 2018-11-27 NOTE — PROGRESS NOTES
Subjective:   Joyce Cintron is a 33 y.o. female here today for evaluation and management of:     Chronic use of opiate drugs therapeutic purposes  Patient presents for pain medication refill.   She uses this for chronic back pain and hip pain. Had extensive surgery done on her hip with bursectomy, IT band release, repair of torn ligaments and femoral head shave down. She was seen at [facility] orthopedics and was referred to Dr. Castillo as an SI joint specialist in [facility] but was unable to find his office.      Pain radiates down from left buttock to left leg.   Uses ibuprofen and norco 7.5/325 about 3-4 pills a day. Decreasing today to 7.5/325 two to 3 pills a day. advsied to limit ibuprofen to 800mg TID, can use tylenol as long as total dose does not exceed 3000mg a day.    and UDS are consistent.   Advised no alcohol or other drugs with this medication.   Advised on side effects: dependence, addiction, constipation, sedation, respiratory depression, death.     Today we had a discussion about decreasing and weaning down pain medication as it is not indicated for chronic pain.       Hip pain  Patient reports that she has been referred to an SI joint specialist by her orthopedic physician. She does say that she intends to make appointment soon. Patient reports that she has had multiple surgeries on her left hip. Last surgery was 12/2015. She has also attended physical therapy. Still with chronic pain on a daily basis, she is on the couch all day.   Always pain on the left hip and left low back going down to her knee. Pain worse with more steps and physical activity.   She does take asa, ibuprofen, tylenol and norco for the pain.   Her skin did not react well with capsaicin cream, will try diclofenac gel/cream  She also uses heating pads and hot baths for the pain.   Notes that the swimming pool helps the most for her pain but it's hard to stay in the pool with 3 kids in tow.  "           Current medicines (including changes today)  Current Outpatient Prescriptions   Medication Sig Dispense Refill   • [START ON 1/26/2019] HYDROcodone-acetaminophen (NORCO) 7.5-325 MG per tablet Take 1 Tab by mouth every 8 hours as needed for up to 30 days. 85 Tab 0   • ibuprofen (MOTRIN) 800 MG Tab TAKE 1 TABLET BY MOUTH EVERY 8 HOURS 90 Tab 0     No current facility-administered medications for this visit.      She  has a past medical history of Anesthesia; Back pain; Depression; Disc disorder; and Pain (12/15/2015).    ROS  No chest pain, no shortness of breath, no abdominal pain       Objective:     Blood pressure 116/74, pulse 78, temperature 36.8 °C (98.3 °F), temperature source Temporal, resp. rate 18, height 1.651 m (5' 5\"), weight 77.1 kg (170 lb), SpO2 97 %, not currently breastfeeding. Body mass index is 28.29 kg/m².   Physical Exam:  Constitutional: Alert, no distress.  Skin: Warm, dry, good turgor, no rashes in visible areas.  Eye: Equal, round and reactive, conjunctiva clear, lids normal.  ENMT: Lips without lesions, good dentition, oropharynx clear.  Neck: Trachea midline, no masses, no thyromegaly. No cervical or supraclavicular lymphadenopathy  Respiratory: Unlabored respiratory effort, lungs clear to auscultation, no wheezes, no ronchi.  Cardiovascular: Normal S1, S2, no murmur, no edema.  Abdomen: Soft, non-tender, no masses, no hepatosplenomegaly.  Psych: Alert and oriented x3, normal affect and mood.        Assessment and Plan:   The following treatment plan was discussed    1. Chronic use of opiate drugs therapeutic purposes  Refills done. Encouraged to wean down.   - Consent for Opiate Prescription  - HYDROcodone-acetaminophen (NORCO) 7.5-325 MG per tablet; Take 1 Tab by mouth every 8 hours as needed for up to 30 days.  Dispense: 85 Tab; Refill: 0  - COMP METABOLIC PANEL; Future    2. Arthralgia of hip, unspecified laterality  Uncontrolled.   - REFERRAL TO PHYSIATRY (PMR)  - Consent " for Opiate Prescription  - HYDROcodone-acetaminophen (NORCO) 7.5-325 MG per tablet; Take 1 Tab by mouth every 8 hours as needed for up to 30 days.  Dispense: 85 Tab; Refill: 0    3. Screening for lipid disorders  - Lipid Profile; Future    4. Family history of early CAD  She notes father and grandfather had early heart attacks about age 50.   - Lipid Profile; Future      Followup: Return in about 3 months (around 2/26/2019) for pain med refills, lab review.

## 2018-11-27 NOTE — ASSESSMENT & PLAN NOTE
Patient presents for pain medication refill.   She uses this for chronic back pain and hip pain. Had extensive surgery done on her hip with bursectomy, IT band release, repair of torn ligaments and femoral head shave down. She was seen at [facility]opedics and was referred to Dr. Castillo as an SI joint specialist in [facility] but was unable to find his office.      Pain radiates down from left buttock to left leg.   Uses ibuprofen and norco 7.5/325 about 3-4 pills a day. Decreasing today to 7.5/325 two to 3 pills a day. advsied to limit ibuprofen to 800mg TID, can use tylenol as long as total dose does not exceed 3000mg a day.    and UDS are consistent.   Advised no alcohol or other drugs with this medication.   Advised on side effects: dependence, addiction, constipation, sedation, respiratory depression, death.     Today we had a discussion about decreasing and weaning down pain medication as it is not indicated for chronic pain.

## 2018-11-27 NOTE — ASSESSMENT & PLAN NOTE
Patient reports that she has been referred to an SI joint specialist by her orthopedic physician. She does say that she intends to make appointment soon. Patient reports that she has had multiple surgeries on her left hip. Last surgery was 12/2015. She has also attended physical therapy. Still with chronic pain on a daily basis, she is on the couch all day.   Always pain on the left hip and left low back going down to her knee. Pain worse with more steps and physical activity.   She does take asa, ibuprofen, tylenol and norco for the pain.   Her skin did not react well with capsaicin cream, will try diclofenac gel/cream  She also uses heating pads and hot baths for the pain.   Notes that the swimming pool helps the most for her pain but it's hard to stay in the pool with 3 kids in tow.

## 2019-11-21 ENCOUNTER — OFFICE VISIT (OUTPATIENT)
Dept: PULMONOLOGY | Facility: HOSPICE | Age: 34
End: 2019-11-21
Payer: COMMERCIAL

## 2019-11-21 VITALS
RESPIRATION RATE: 16 BRPM | OXYGEN SATURATION: 96 % | SYSTOLIC BLOOD PRESSURE: 122 MMHG | HEIGHT: 65 IN | HEART RATE: 117 BPM | WEIGHT: 201 LBS | DIASTOLIC BLOOD PRESSURE: 80 MMHG | BODY MASS INDEX: 33.49 KG/M2

## 2019-11-21 DIAGNOSIS — G47.00 INSOMNIA, UNSPECIFIED TYPE: ICD-10-CM

## 2019-11-21 DIAGNOSIS — Z23 NEED FOR VACCINATION: ICD-10-CM

## 2019-11-21 DIAGNOSIS — G47.33 OSA (OBSTRUCTIVE SLEEP APNEA): ICD-10-CM

## 2019-11-21 DIAGNOSIS — E66.9 OBESITY (BMI 30-39.9): ICD-10-CM

## 2019-11-21 PROBLEM — Z79.891 CHRONIC USE OF OPIATE DRUGS THERAPEUTIC PURPOSES: Status: RESOLVED | Noted: 2017-03-06 | Resolved: 2019-11-21

## 2019-11-21 PROBLEM — R06.83 SNORING: Status: RESOLVED | Noted: 2017-03-06 | Resolved: 2019-11-21

## 2019-11-21 PROCEDURE — 90686 IIV4 VACC NO PRSV 0.5 ML IM: CPT | Performed by: NURSE PRACTITIONER

## 2019-11-21 PROCEDURE — 90471 IMMUNIZATION ADMIN: CPT | Performed by: NURSE PRACTITIONER

## 2019-11-21 PROCEDURE — 99214 OFFICE O/P EST MOD 30 MIN: CPT | Mod: 25 | Performed by: NURSE PRACTITIONER

## 2019-11-21 ASSESSMENT — ENCOUNTER SYMPTOMS
EYE PAIN: 0
BRUISES/BLEEDS EASILY: 0
CONSTITUTIONAL NEGATIVE: 1
PSYCHIATRIC NEGATIVE: 1
NEUROLOGICAL NEGATIVE: 1
EYE DISCHARGE: 0
RESPIRATORY NEGATIVE: 1
MUSCULOSKELETAL NEGATIVE: 1
CARDIOVASCULAR NEGATIVE: 1

## 2019-11-21 NOTE — PROGRESS NOTES
Chief Complaint   Patient presents with   • Apnea     Last Seen 05/03/18         HPI: This patient is a 34 y.o. female, who presents for annual follow-up of obstructive sleep apnea.  Her children accompany her today.    Medical history includes obesity BMI 33, chronic hip pain, anxiety disorder, insomnia.     PSG indicates moderate sleep apnea with an AHI of 22.5, minimum oxygen saturation of 92 %. Her events were primarily hypopneas. She was initiated on auto CPAP 5-15 cm H2O. Compliance download over the past 30 days indicates 97 % compliance, average use of 8 hrs 55min per night, AHI of 0.8. Patient reports benefiting from therapy.  She is adjusted well to her machine.  She has plenty of supplies.  She denies EDS or a.m. headache.  She has a history of chronic insomnia but has stopped taking Ambien.  She uses melatonin when needed.  She maintains good sleep habits.  She has also been taken off all of her anxiety medication and reports no anxiety symptoms.  Unfortunately she suffered a miscarriage in March and this set her back a bit but she reports doing very well now.  She follows with her PCP routinely.  Her BMI is 33.  She says she has not been exercising as much as she would like and has gained weight.  Gentle exercise encouraged and nutrition counseling performed.      Past Medical History:   Diagnosis Date   • Anesthesia     PONV   • Back pain    • Depression    • Disc disorder    • Pain 12/15/2015    left hip and buttock       Social History     Tobacco Use   • Smoking status: Never Smoker   • Smokeless tobacco: Never Used   Substance Use Topics   • Alcohol use: Not Currently     Alcohol/week: 0.0 oz   • Drug use: No       Family History   Problem Relation Age of Onset   • Heart Disease Mother    • Heart Disease Father    • Heart Disease Paternal Grandmother    • Heart Disease Paternal Grandfather    • Diabetes Other    • Hypertension Other        Immunization History   Administered Date(s) Administered  "  • Influenza Vaccine Quad Inj (Pf) 11/21/2019   • Tdap Vaccine 03/12/2009       Current medications as of today   Current Outpatient Medications   Medication Sig Dispense Refill   • ibuprofen (MOTRIN) 800 MG Tab TAKE 1 TABLET BY MOUTH EVERY 8 HOURS 90 Tab 0     No current facility-administered medications for this visit.        Allergies: Patient has no known allergies.    /80 (BP Location: Right arm, Patient Position: Sitting, BP Cuff Size: Adult)   Pulse (!) 117   Resp 16   Ht 1.651 m (5' 5\")   Wt 91.2 kg (201 lb)   SpO2 96%       Review of Systems   Constitutional: Negative.    HENT: Negative.    Eyes: Negative for pain and discharge.   Respiratory: Negative.    Cardiovascular: Negative.    Musculoskeletal: Negative.    Neurological: Negative.    Endo/Heme/Allergies: Negative for environmental allergies. Does not bruise/bleed easily.   Psychiatric/Behavioral: Negative.        Physical Exam   Constitutional: She is oriented to person, place, and time and well-developed, well-nourished, and in no distress.   HENT:   Head: Normocephalic and atraumatic.   Eyes: Pupils are equal, round, and reactive to light.   Neck: Normal range of motion. Neck supple. No tracheal deviation present.   Cardiovascular: Normal rate, regular rhythm and normal heart sounds.   Pulmonary/Chest: Effort normal and breath sounds normal.   Musculoskeletal: Normal range of motion.   Neurological: She is alert and oriented to person, place, and time. Gait normal.   Skin: Skin is warm and dry.   Psychiatric: Mood, memory, affect and judgment normal.       Diagnoses/Plan:    1. Need for vaccination  -influenza Vaccine Quad Injection (PF)    2. BABAK (obstructive sleep apnea)   Continue auto CPAP nightly, Clean mask & tubing weekly, Replace supplies as insurance will allow, RX for new supplies to DME  - DME Mask and Supplies    3. Insomnia, unspecified type  Sleep hygiene again reviewed.  She is encouraged to continue relaxation techniques " before bed and use melatonin as needed.    4. Obesity (BMI 30-39.9)  Patient's body mass index is 33.45 kg/m². Exercise and nutrition counseling were performed at this visit.  - Patient identified as having weight management issue.  Appropriate orders and counseling given.    Follow-up annually, sooner if needed    This dictation was created using voice recognition software. The accuracy of the dictation is limited to the abilities of the software. I expect there may be some errors of grammar and possibly content.

## 2020-01-08 ENCOUNTER — APPOINTMENT (OUTPATIENT)
Dept: RADIOLOGY | Facility: IMAGING CENTER | Age: 35
End: 2020-01-08
Attending: NURSE PRACTITIONER
Payer: COMMERCIAL

## 2020-01-08 ENCOUNTER — OFFICE VISIT (OUTPATIENT)
Dept: URGENT CARE | Facility: CLINIC | Age: 35
End: 2020-01-08
Payer: COMMERCIAL

## 2020-01-08 VITALS
BODY MASS INDEX: 30.82 KG/M2 | HEART RATE: 86 BPM | RESPIRATION RATE: 12 BRPM | OXYGEN SATURATION: 97 % | HEIGHT: 65 IN | WEIGHT: 185 LBS | DIASTOLIC BLOOD PRESSURE: 76 MMHG | TEMPERATURE: 98.4 F | SYSTOLIC BLOOD PRESSURE: 104 MMHG

## 2020-01-08 DIAGNOSIS — R05.9 COUGH: ICD-10-CM

## 2020-01-08 DIAGNOSIS — J02.9 SORE THROAT: ICD-10-CM

## 2020-01-08 DIAGNOSIS — J01.00 ACUTE MAXILLARY SINUSITIS, RECURRENCE NOT SPECIFIED: ICD-10-CM

## 2020-01-08 DIAGNOSIS — R06.02 SOB (SHORTNESS OF BREATH): ICD-10-CM

## 2020-01-08 LAB
INT CON NEG: NORMAL
INT CON POS: NORMAL
S PYO AG THROAT QL: NEGATIVE

## 2020-01-08 PROCEDURE — 99214 OFFICE O/P EST MOD 30 MIN: CPT | Performed by: NURSE PRACTITIONER

## 2020-01-08 PROCEDURE — 71046 X-RAY EXAM CHEST 2 VIEWS: CPT | Mod: TC,FY | Performed by: NURSE PRACTITIONER

## 2020-01-08 PROCEDURE — 87880 STREP A ASSAY W/OPTIC: CPT | Performed by: NURSE PRACTITIONER

## 2020-01-08 RX ORDER — ALBUTEROL SULFATE 90 UG/1
2 AEROSOL, METERED RESPIRATORY (INHALATION) EVERY 6 HOURS PRN
Qty: 8.5 G | Refills: 0 | Status: SHIPPED | OUTPATIENT
Start: 2020-01-08 | End: 2021-07-02

## 2020-01-08 RX ORDER — CODEINE PHOSPHATE AND GUAIFENESIN 10; 100 MG/5ML; MG/5ML
5 SOLUTION ORAL EVERY 6 HOURS PRN
Qty: 120 ML | Refills: 0 | Status: SHIPPED | OUTPATIENT
Start: 2020-01-08 | End: 2020-01-15

## 2020-01-08 RX ORDER — AMOXICILLIN AND CLAVULANATE POTASSIUM 875; 125 MG/1; MG/1
1 TABLET, FILM COATED ORAL 2 TIMES DAILY
Qty: 14 TAB | Refills: 0 | Status: SHIPPED | OUTPATIENT
Start: 2020-01-08 | End: 2020-01-15

## 2020-01-08 ASSESSMENT — ENCOUNTER SYMPTOMS
HEADACHES: 1
COUGH: 1
CHILLS: 1
MYALGIAS: 1
SPUTUM PRODUCTION: 1
SHORTNESS OF BREATH: 1
SORE THROAT: 1
FEVER: 1

## 2020-01-08 NOTE — PROGRESS NOTES
Subjective:      Mariaa Cintron is a 35 y.o. female who presents with Sore Throat (x2 weeks); Shortness of Breath (x3 days ); Cough; and Headache    Past Medical History:   Diagnosis Date   • Anesthesia     PONV   • Back pain    • Depression    • Disc disorder    • Pain 12/15/2015    left hip and buttock     Social History     Socioeconomic History   • Marital status:      Spouse name: Not on file   • Number of children: Not on file   • Years of education: Not on file   • Highest education level: Not on file   Occupational History   • Not on file   Social Needs   • Financial resource strain: Not on file   • Food insecurity:     Worry: Not on file     Inability: Not on file   • Transportation needs:     Medical: Not on file     Non-medical: Not on file   Tobacco Use   • Smoking status: Never Smoker   • Smokeless tobacco: Never Used   Substance and Sexual Activity   • Alcohol use: Not Currently     Alcohol/week: 0.0 oz   • Drug use: No   • Sexual activity: Yes     Partners: Male   Lifestyle   • Physical activity:     Days per week: Not on file     Minutes per session: Not on file   • Stress: Not on file   Relationships   • Social connections:     Talks on phone: Not on file     Gets together: Not on file     Attends Shinto service: Not on file     Active member of club or organization: Not on file     Attends meetings of clubs or organizations: Not on file     Relationship status: Not on file   • Intimate partner violence:     Fear of current or ex partner: Not on file     Emotionally abused: Not on file     Physically abused: Not on file     Forced sexual activity: Not on file   Other Topics Concern   • Not on file   Social History Narrative   • Not on file     Family History   Problem Relation Age of Onset   • Heart Disease Mother    • Heart Disease Father    • Heart Disease Paternal Grandmother    • Heart Disease Paternal Grandfather    • Diabetes Other    • Hypertension Other   "      Allergies: Patient has no known allergies.    Patient is a 35-year-old female who presents today with complaint of fever, aches, chills, cough with shortness of breath, sore throat, and headache.  Sore throat has been off and on over the last 2 weeks.  Cough and respiratory symptoms have been more recent over the last week.  States cough is been productive of thick green sputum        Cough   This is a new problem. The current episode started in the past 7 days. The problem has been unchanged. The problem occurs every few minutes. The cough is productive of brown sputum. Associated symptoms include chest pain, chills, a fever, headaches, myalgias, nasal congestion, postnasal drip, a sore throat and shortness of breath. She has tried nothing for the symptoms. The treatment provided no relief.       Review of Systems   Constitutional: Positive for chills, fever and malaise/fatigue.   HENT: Positive for congestion, postnasal drip and sore throat.    Respiratory: Positive for cough, sputum production and shortness of breath.    Cardiovascular: Positive for chest pain.   Musculoskeletal: Positive for myalgias.   Neurological: Positive for headaches.   All other systems reviewed and are negative.         Objective:     /76 (BP Location: Left arm, Patient Position: Sitting, BP Cuff Size: Adult)   Pulse 86   Temp 36.9 °C (98.4 °F) (Temporal)   Resp 12   Ht 1.651 m (5' 5\")   Wt 83.9 kg (185 lb)   LMP 01/06/2020 (Exact Date)   SpO2 97%   Breastfeeding? No   BMI 30.79 kg/m²      Physical Exam  Vitals signs reviewed.   Constitutional:       Appearance: Normal appearance.   HENT:      Head: Normocephalic.      Right Ear: Tympanic membrane, ear canal and external ear normal. There is no impacted cerumen.      Left Ear: Tympanic membrane, ear canal and external ear normal. There is no impacted cerumen.      Nose: Congestion present.      Mouth/Throat:      Mouth: Mucous membranes are moist.   Eyes:      " Extraocular Movements: Extraocular movements intact.      Conjunctiva/sclera: Conjunctivae normal.      Pupils: Pupils are equal, round, and reactive to light.   Neck:      Musculoskeletal: Normal range of motion and neck supple.   Cardiovascular:      Rate and Rhythm: Normal rate and regular rhythm.      Heart sounds: Normal heart sounds.   Pulmonary:      Effort: Pulmonary effort is normal.      Comments: Breath sounds slightly harsh with productive cough.  Musculoskeletal: Normal range of motion.   Skin:     General: Skin is warm and dry.   Neurological:      Mental Status: She is alert and oriented to person, place, and time.   Psychiatric:         Mood and Affect: Mood normal.         Behavior: Behavior normal.         Thought Content: Thought content normal.         Judgment: Judgment normal.       poct strep: negative     X-ray, chest:    1/8/2020 2:24 PM     HISTORY/REASON FOR EXAM:  Cough and shortness of breath     TECHNIQUE/EXAM DESCRIPTION AND NUMBER OF VIEWS:  Two views of the chest.     COMPARISON: None.     FINDINGS:  There is no evidence of focal consolidation or evidence of pulmonary edema.  The heart is normal in size.  There is no evidence of pleural effusion.  Soft tissues and bony structures are unremarkable.        IMPRESSION:     No evidence of acute cardiopulmonary process.          Assessment/Plan:       1. Sore throat  2. Cough  3. Sinusitis  4. Bronchitis    Augmentin  Albuterol PRN  Cheratussin PRN at night for cough; clearly stated no driving or alcohol with this medication  Tylenol/Motrin as needed  Follow-up for persistent or worsening of symptoms

## 2020-08-24 ENCOUNTER — HOSPITAL ENCOUNTER (OUTPATIENT)
Dept: HOSPITAL 8 - LDOP | Age: 35
Discharge: HOME | End: 2020-08-24
Attending: OBSTETRICS & GYNECOLOGY
Payer: COMMERCIAL

## 2020-08-24 VITALS — BODY MASS INDEX: 33.72 KG/M2 | WEIGHT: 202.38 LBS | HEIGHT: 65 IN

## 2020-08-24 DIAGNOSIS — Z3A.33: ICD-10-CM

## 2020-08-24 DIAGNOSIS — O24.414: Primary | ICD-10-CM

## 2020-08-24 PROCEDURE — 59025 FETAL NON-STRESS TEST: CPT

## 2020-08-24 PROCEDURE — 76819 FETAL BIOPHYS PROFIL W/O NST: CPT

## 2020-08-27 ENCOUNTER — HOSPITAL ENCOUNTER (OUTPATIENT)
Dept: HOSPITAL 8 - LDOP | Age: 35
Discharge: HOME | End: 2020-08-27
Attending: OBSTETRICS & GYNECOLOGY
Payer: COMMERCIAL

## 2020-08-27 VITALS — BODY MASS INDEX: 33.72 KG/M2 | WEIGHT: 202.41 LBS | HEIGHT: 65 IN

## 2020-08-27 VITALS — DIASTOLIC BLOOD PRESSURE: 84 MMHG | SYSTOLIC BLOOD PRESSURE: 125 MMHG

## 2020-08-27 DIAGNOSIS — Z3A.33: ICD-10-CM

## 2020-08-27 LAB — MICROSCOPIC: (no result)

## 2020-08-27 PROCEDURE — 81001 URINALYSIS AUTO W/SCOPE: CPT

## 2020-08-27 PROCEDURE — 87086 URINE CULTURE/COLONY COUNT: CPT

## 2020-08-27 PROCEDURE — 76817 TRANSVAGINAL US OBSTETRIC: CPT

## 2020-08-27 PROCEDURE — 59025 FETAL NON-STRESS TEST: CPT

## 2020-09-07 ENCOUNTER — HOSPITAL ENCOUNTER (OUTPATIENT)
Dept: HOSPITAL 8 - LDOP | Age: 35
Discharge: HOME | End: 2020-09-07
Attending: OBSTETRICS & GYNECOLOGY
Payer: COMMERCIAL

## 2020-09-07 VITALS — SYSTOLIC BLOOD PRESSURE: 151 MMHG | DIASTOLIC BLOOD PRESSURE: 79 MMHG

## 2020-09-07 VITALS — WEIGHT: 202.38 LBS | HEIGHT: 65 IN | BODY MASS INDEX: 33.72 KG/M2

## 2020-09-07 DIAGNOSIS — O14.93: ICD-10-CM

## 2020-09-07 DIAGNOSIS — O09.523: Primary | ICD-10-CM

## 2020-09-07 DIAGNOSIS — Z3A.35: ICD-10-CM

## 2020-09-07 DIAGNOSIS — O13.3: ICD-10-CM

## 2020-09-07 LAB
ALBUMIN SERPL-MCNC: 2.6 G/DL (ref 3.4–5)
ALP SERPL-CCNC: 107 U/L (ref 45–117)
ALT SERPL-CCNC: 22 U/L (ref 12–78)
ANION GAP SERPL CALC-SCNC: 8 MMOL/L (ref 5–15)
BASOPHILS # BLD AUTO: 0.04 X10^3/UL (ref 0–0.1)
BASOPHILS NFR BLD AUTO: 0 % (ref 0–1)
BILIRUB SERPL-MCNC: 0.4 MG/DL (ref 0.2–1)
CALCIUM SERPL-MCNC: 8.8 MG/DL (ref 8.5–10.1)
CHLORIDE SERPL-SCNC: 111 MMOL/L (ref 98–107)
CREAT SERPL-MCNC: 0.76 MG/DL (ref 0.55–1.02)
EOSINOPHIL # BLD AUTO: 0.01 X10^3/UL (ref 0–0.4)
EOSINOPHIL NFR BLD AUTO: 0 % (ref 1–7)
ERYTHROCYTE [DISTWIDTH] IN BLOOD BY AUTOMATED COUNT: 14.9 % (ref 9.6–15.2)
HBV CORE IGM SERPL QL IA: 27 MG/DL (ref 0–12)
HBV SURFACE AB SER RIA-ACNC: 75.3 MG/DL
LYMPHOCYTES # BLD AUTO: 1.64 X10^3/UL (ref 1–3.4)
LYMPHOCYTES NFR BLD AUTO: 16 % (ref 22–44)
MCH RBC QN AUTO: 29.6 PG (ref 27–34.8)
MCHC RBC AUTO-ENTMCNC: 33.6 G/DL (ref 32.4–35.8)
MCV RBC AUTO: 88.2 FL (ref 80–100)
MD: NO
MICROSCOPIC: (no result)
MONOCYTES # BLD AUTO: 0.43 X10^3/UL (ref 0.2–0.8)
MONOCYTES NFR BLD AUTO: 4 % (ref 2–9)
NEUTROPHILS # BLD AUTO: 8.4 X10^3/UL (ref 1.8–6.8)
NEUTROPHILS NFR BLD AUTO: 80 % (ref 42–75)
PLATELET # BLD AUTO: 207 X10^3/UL (ref 130–400)
PMV BLD AUTO: 8.8 FL (ref 7.4–10.4)
PROT SERPL-MCNC: 6.4 G/DL (ref 6.4–8.2)
RBC # BLD AUTO: 3.65 X10^6/UL (ref 3.82–5.3)

## 2020-09-07 PROCEDURE — 84550 ASSAY OF BLOOD/URIC ACID: CPT

## 2020-09-07 PROCEDURE — 36415 COLL VENOUS BLD VENIPUNCTURE: CPT

## 2020-09-07 PROCEDURE — 59025 FETAL NON-STRESS TEST: CPT

## 2020-09-07 PROCEDURE — 84156 ASSAY OF PROTEIN URINE: CPT

## 2020-09-07 PROCEDURE — 80053 COMPREHEN METABOLIC PANEL: CPT

## 2020-09-07 PROCEDURE — 81001 URINALYSIS AUTO W/SCOPE: CPT

## 2020-09-07 PROCEDURE — 82570 ASSAY OF URINE CREATININE: CPT

## 2020-09-07 PROCEDURE — 85025 COMPLETE CBC W/AUTO DIFF WBC: CPT

## 2020-09-28 ENCOUNTER — HOSPITAL ENCOUNTER (OUTPATIENT)
Dept: HOSPITAL 8 - LDOP | Age: 35
Discharge: HOME | End: 2020-09-28
Attending: OBSTETRICS & GYNECOLOGY
Payer: COMMERCIAL

## 2020-09-28 VITALS — WEIGHT: 202.41 LBS | HEIGHT: 65 IN | BODY MASS INDEX: 33.72 KG/M2

## 2020-09-28 VITALS — SYSTOLIC BLOOD PRESSURE: 134 MMHG | DIASTOLIC BLOOD PRESSURE: 85 MMHG

## 2020-09-28 DIAGNOSIS — Z3A.38: ICD-10-CM

## 2020-09-28 DIAGNOSIS — O09.523: Primary | ICD-10-CM

## 2020-09-28 DIAGNOSIS — Z20.828: ICD-10-CM

## 2020-09-28 DIAGNOSIS — O16.3: ICD-10-CM

## 2020-09-28 LAB
ALBUMIN SERPL-MCNC: 2.5 G/DL (ref 3.4–5)
ALP SERPL-CCNC: 135 U/L (ref 45–117)
ALT SERPL-CCNC: 17 U/L (ref 12–78)
ANION GAP SERPL CALC-SCNC: 9 MMOL/L (ref 5–15)
BASOPHILS # BLD AUTO: 0.04 X10^3/UL (ref 0–0.1)
BASOPHILS NFR BLD AUTO: 0 % (ref 0–1)
BILIRUB SERPL-MCNC: 0.2 MG/DL (ref 0.2–1)
CALCIUM SERPL-MCNC: 8.6 MG/DL (ref 8.5–10.1)
CHLORIDE SERPL-SCNC: 110 MMOL/L (ref 98–107)
CREAT SERPL-MCNC: 0.99 MG/DL (ref 0.55–1.02)
EOSINOPHIL # BLD AUTO: 0.06 X10^3/UL (ref 0–0.4)
EOSINOPHIL NFR BLD AUTO: 1 % (ref 1–7)
ERYTHROCYTE [DISTWIDTH] IN BLOOD BY AUTOMATED COUNT: 15.2 % (ref 9.6–15.2)
HBV CORE IGM SERPL QL IA: 16 MG/DL (ref 0–12)
HBV SURFACE AB SER RIA-ACNC: 57.4 MG/DL
LYMPHOCYTES # BLD AUTO: 1.76 X10^3/UL (ref 1–3.4)
LYMPHOCYTES NFR BLD AUTO: 17 % (ref 22–44)
MCH RBC QN AUTO: 29 PG (ref 27–34.8)
MCHC RBC AUTO-ENTMCNC: 32.4 G/DL (ref 32.4–35.8)
MD: NO
MICROSCOPIC: (no result)
MONOCYTES # BLD AUTO: 0.62 X10^3/UL (ref 0.2–0.8)
MONOCYTES NFR BLD AUTO: 6 % (ref 2–9)
NEUTROPHILS # BLD AUTO: 8.05 X10^3/UL (ref 1.8–6.8)
NEUTROPHILS NFR BLD AUTO: 76 % (ref 42–75)
PLATELET # BLD AUTO: 230 X10^3/UL (ref 130–400)
PMV BLD AUTO: 9.1 FL (ref 7.4–10.4)
PROT SERPL-MCNC: 6.1 G/DL (ref 6.4–8.2)
RBC # BLD AUTO: 3.61 X10^6/UL (ref 3.82–5.3)

## 2020-09-28 PROCEDURE — 36415 COLL VENOUS BLD VENIPUNCTURE: CPT

## 2020-09-28 PROCEDURE — 85025 COMPLETE CBC W/AUTO DIFF WBC: CPT

## 2020-09-28 PROCEDURE — 84156 ASSAY OF PROTEIN URINE: CPT

## 2020-09-28 PROCEDURE — 84550 ASSAY OF BLOOD/URIC ACID: CPT

## 2020-09-28 PROCEDURE — 82570 ASSAY OF URINE CREATININE: CPT

## 2020-09-28 PROCEDURE — 87635 SARS-COV-2 COVID-19 AMP PRB: CPT

## 2020-09-28 PROCEDURE — 59025 FETAL NON-STRESS TEST: CPT

## 2020-09-28 PROCEDURE — 81001 URINALYSIS AUTO W/SCOPE: CPT

## 2020-09-28 PROCEDURE — 80053 COMPREHEN METABOLIC PANEL: CPT

## 2020-10-02 ENCOUNTER — HOSPITAL ENCOUNTER (INPATIENT)
Dept: HOSPITAL 8 - LDIP | Age: 35
LOS: 2 days | Discharge: HOME | End: 2020-10-04
Attending: OBSTETRICS & GYNECOLOGY | Admitting: OBSTETRICS & GYNECOLOGY
Payer: COMMERCIAL

## 2020-10-02 VITALS — SYSTOLIC BLOOD PRESSURE: 125 MMHG | DIASTOLIC BLOOD PRESSURE: 73 MMHG

## 2020-10-02 VITALS — HEIGHT: 65 IN | BODY MASS INDEX: 33.43 KG/M2 | WEIGHT: 200.62 LBS

## 2020-10-02 DIAGNOSIS — Z79.4: ICD-10-CM

## 2020-10-02 DIAGNOSIS — Z3A.38: ICD-10-CM

## 2020-10-02 LAB
ALBUMIN SERPL-MCNC: 2.5 G/DL (ref 3.4–5)
ALP SERPL-CCNC: 137 U/L (ref 45–117)
ALT SERPL-CCNC: 17 U/L (ref 12–78)
ANION GAP SERPL CALC-SCNC: 7 MMOL/L (ref 5–15)
BASOPHILS # BLD AUTO: 0.05 X10^3/UL (ref 0–0.1)
BASOPHILS NFR BLD AUTO: 1 % (ref 0–1)
BILIRUB SERPL-MCNC: 0.3 MG/DL (ref 0.2–1)
CALCIUM SERPL-MCNC: 9.7 MG/DL (ref 8.5–10.1)
CHLORIDE SERPL-SCNC: 113 MMOL/L (ref 98–107)
CREAT SERPL-MCNC: 0.97 MG/DL (ref 0.55–1.02)
EOSINOPHIL # BLD AUTO: 0.05 X10^3/UL (ref 0–0.4)
EOSINOPHIL NFR BLD AUTO: 1 % (ref 1–7)
ERYTHROCYTE [DISTWIDTH] IN BLOOD BY AUTOMATED COUNT: 15 % (ref 9.6–15.2)
HBV CORE IGM SERPL QL IA: 10 MG/DL (ref 0–12)
HBV SURFACE AB SER RIA-ACNC: 17.9 MG/DL
LYMPHOCYTES # BLD AUTO: 2.06 X10^3/UL (ref 1–3.4)
LYMPHOCYTES NFR BLD AUTO: 21 % (ref 22–44)
MCH RBC QN AUTO: 29.6 PG (ref 27–34.8)
MCHC RBC AUTO-ENTMCNC: 33.6 G/DL (ref 32.4–35.8)
MD: NO
MONOCYTES # BLD AUTO: 0.51 X10^3/UL (ref 0.2–0.8)
MONOCYTES NFR BLD AUTO: 5 % (ref 2–9)
NEUTROPHILS # BLD AUTO: 7.39 X10^3/UL (ref 1.8–6.8)
NEUTROPHILS NFR BLD AUTO: 73 % (ref 42–75)
PLATELET # BLD AUTO: 197 X10^3/UL (ref 130–400)
PMV BLD AUTO: 9.5 FL (ref 7.4–10.4)
PROT SERPL-MCNC: 6.1 G/DL (ref 6.4–8.2)
RBC # BLD AUTO: 3.69 X10^6/UL (ref 3.82–5.3)

## 2020-10-02 PROCEDURE — 80053 COMPREHEN METABOLIC PANEL: CPT

## 2020-10-02 PROCEDURE — 36415 COLL VENOUS BLD VENIPUNCTURE: CPT

## 2020-10-02 PROCEDURE — 86850 RBC ANTIBODY SCREEN: CPT

## 2020-10-02 PROCEDURE — 3E0R3BZ INTRODUCTION OF ANESTHETIC AGENT INTO SPINAL CANAL, PERCUTANEOUS APPROACH: ICD-10-PCS | Performed by: OBSTETRICS & GYNECOLOGY

## 2020-10-02 PROCEDURE — 82570 ASSAY OF URINE CREATININE: CPT

## 2020-10-02 PROCEDURE — 82962 GLUCOSE BLOOD TEST: CPT

## 2020-10-02 PROCEDURE — 85025 COMPLETE CBC W/AUTO DIFF WBC: CPT

## 2020-10-02 PROCEDURE — 86900 BLOOD TYPING SEROLOGIC ABO: CPT

## 2020-10-02 PROCEDURE — 0HQ9XZZ REPAIR PERINEUM SKIN, EXTERNAL APPROACH: ICD-10-PCS | Performed by: OBSTETRICS & GYNECOLOGY

## 2020-10-02 PROCEDURE — 86592 SYPHILIS TEST NON-TREP QUAL: CPT

## 2020-10-02 PROCEDURE — 84156 ASSAY OF PROTEIN URINE: CPT

## 2020-10-02 PROCEDURE — 10907ZC DRAINAGE OF AMNIOTIC FLUID, THERAPEUTIC FROM PRODUCTS OF CONCEPTION, VIA NATURAL OR ARTIFICIAL OPENING: ICD-10-PCS | Performed by: OBSTETRICS & GYNECOLOGY

## 2020-10-02 PROCEDURE — 00HU33Z INSERTION OF INFUSION DEVICE INTO SPINAL CANAL, PERCUTANEOUS APPROACH: ICD-10-PCS | Performed by: OBSTETRICS & GYNECOLOGY

## 2020-10-02 RX ADMIN — SODIUM CHLORIDE, SODIUM LACTATE, POTASSIUM CHLORIDE, AND CALCIUM CHLORIDE SCH MLS/HR: .6; .31; .03; .02 INJECTION, SOLUTION INTRAVENOUS at 22:29

## 2020-10-02 RX ADMIN — PENICILLIN G POTASSIUM SCH MLS/HR: 5000000 POWDER, FOR SOLUTION INTRAMUSCULAR; INTRAPLEURAL; INTRATHECAL; INTRAVENOUS at 15:01

## 2020-10-02 RX ADMIN — PENICILLIN G POTASSIUM SCH MLS/HR: 5000000 POWDER, FOR SOLUTION INTRAMUSCULAR; INTRAPLEURAL; INTRATHECAL; INTRAVENOUS at 11:24

## 2020-10-02 RX ADMIN — SODIUM CHLORIDE, SODIUM LACTATE, POTASSIUM CHLORIDE, AND CALCIUM CHLORIDE SCH MLS/HR: .6; .31; .03; .02 INJECTION, SOLUTION INTRAVENOUS at 06:56

## 2020-10-02 RX ADMIN — IBUPROFEN PRN MG: 600 TABLET ORAL at 23:17

## 2020-10-02 RX ADMIN — PENICILLIN G POTASSIUM SCH MLS/HR: 5000000 POWDER, FOR SOLUTION INTRAMUSCULAR; INTRAPLEURAL; INTRATHECAL; INTRAVENOUS at 20:05

## 2020-10-02 RX ADMIN — SODIUM CHLORIDE, SODIUM LACTATE, POTASSIUM CHLORIDE, AND CALCIUM CHLORIDE SCH MLS/HR: .6; .31; .03; .02 INJECTION, SOLUTION INTRAVENOUS at 12:04

## 2020-10-02 RX ADMIN — SODIUM CHLORIDE, SODIUM LACTATE, POTASSIUM CHLORIDE, AND CALCIUM CHLORIDE SCH MLS/HR: .6; .31; .03; .02 INJECTION, SOLUTION INTRAVENOUS at 12:47

## 2020-10-03 VITALS — DIASTOLIC BLOOD PRESSURE: 69 MMHG | SYSTOLIC BLOOD PRESSURE: 120 MMHG

## 2020-10-03 VITALS — SYSTOLIC BLOOD PRESSURE: 136 MMHG | DIASTOLIC BLOOD PRESSURE: 80 MMHG

## 2020-10-03 VITALS — DIASTOLIC BLOOD PRESSURE: 82 MMHG | SYSTOLIC BLOOD PRESSURE: 127 MMHG

## 2020-10-03 VITALS — SYSTOLIC BLOOD PRESSURE: 124 MMHG | DIASTOLIC BLOOD PRESSURE: 80 MMHG

## 2020-10-03 VITALS — SYSTOLIC BLOOD PRESSURE: 124 MMHG | DIASTOLIC BLOOD PRESSURE: 82 MMHG

## 2020-10-03 LAB
BASOPHILS # BLD AUTO: 0.04 X10^3/UL (ref 0–0.1)
BASOPHILS NFR BLD AUTO: 0 % (ref 0–1)
EOSINOPHIL # BLD AUTO: 0.07 X10^3/UL (ref 0–0.4)
EOSINOPHIL NFR BLD AUTO: 1 % (ref 1–7)
ERYTHROCYTE [DISTWIDTH] IN BLOOD BY AUTOMATED COUNT: 15.1 % (ref 9.6–15.2)
LYMPHOCYTES # BLD AUTO: 1.95 X10^3/UL (ref 1–3.4)
LYMPHOCYTES NFR BLD AUTO: 15 % (ref 22–44)
MCH RBC QN AUTO: 29.1 PG (ref 27–34.8)
MCHC RBC AUTO-ENTMCNC: 32.8 G/DL (ref 32.4–35.8)
MD: NO
MONOCYTES # BLD AUTO: 0.9 X10^3/UL (ref 0.2–0.8)
MONOCYTES NFR BLD AUTO: 7 % (ref 2–9)
NEUTROPHILS # BLD AUTO: 9.94 X10^3/UL (ref 1.8–6.8)
NEUTROPHILS NFR BLD AUTO: 77 % (ref 42–75)
PLATELET # BLD AUTO: 173 X10^3/UL (ref 130–400)
PMV BLD AUTO: 9.7 FL (ref 7.4–10.4)
RBC # BLD AUTO: 3.31 X10^6/UL (ref 3.82–5.3)

## 2020-10-03 RX ADMIN — DOCUSATE SODIUM PRN MG: 100 CAPSULE, LIQUID FILLED ORAL at 21:17

## 2020-10-03 RX ADMIN — IBUPROFEN PRN MG: 600 TABLET ORAL at 08:18

## 2020-10-03 RX ADMIN — IBUPROFEN PRN MG: 600 TABLET ORAL at 14:55

## 2020-10-03 RX ADMIN — HYDROCODONE BITARTRATE AND ACETAMINOPHEN PRN TAB: 5; 325 TABLET ORAL at 21:18

## 2020-10-03 RX ADMIN — HYDROCODONE BITARTRATE AND ACETAMINOPHEN PRN TAB: 5; 325 TABLET ORAL at 14:56

## 2020-10-03 RX ADMIN — HYDROCODONE BITARTRATE AND ACETAMINOPHEN PRN TAB: 5; 325 TABLET ORAL at 08:18

## 2020-10-03 RX ADMIN — HYDROCODONE BITARTRATE AND ACETAMINOPHEN PRN TAB: 5; 325 TABLET ORAL at 00:29

## 2020-10-03 RX ADMIN — DOCUSATE SODIUM PRN MG: 100 CAPSULE, LIQUID FILLED ORAL at 08:17

## 2020-10-03 RX ADMIN — PRENATAL VIT W/ FE FUMARATE-FA TAB 27-0.8 MG SCH EACH: 27-0.8 TAB at 08:17

## 2020-10-03 RX ADMIN — IBUPROFEN PRN MG: 600 TABLET ORAL at 21:18

## 2020-10-04 VITALS — SYSTOLIC BLOOD PRESSURE: 130 MMHG | DIASTOLIC BLOOD PRESSURE: 84 MMHG

## 2020-10-04 RX ADMIN — IBUPROFEN PRN MG: 600 TABLET ORAL at 03:15

## 2020-10-04 RX ADMIN — PRENATAL VIT W/ FE FUMARATE-FA TAB 27-0.8 MG SCH EACH: 27-0.8 TAB at 09:00

## 2020-10-04 RX ADMIN — HYDROCODONE BITARTRATE AND ACETAMINOPHEN PRN TAB: 5; 325 TABLET ORAL at 03:17

## 2020-11-23 PROBLEM — Z23 NEED FOR INFLUENZA VACCINATION: Status: ACTIVE | Noted: 2020-11-23

## 2020-11-23 PROBLEM — Z78.9 NON-SMOKER: Status: ACTIVE | Noted: 2020-11-23

## 2020-11-23 NOTE — PROGRESS NOTES
CC: Follow-up for BABAK on APAP 5-15 cm water    HPI:  Joyce Cintron is a 35 y.o.female  kindly last seen 11/21/2019  for BABAK on CPAP.  At that time her compliance was excellent with a normalized AHI of 0.8.  Her 8/21/2017 AHI was 22.5/yobany saturation 92%.    Today, 11/24/2020, her 30-day compliance is 97% for 8 hours and 20 minutes.  She has an average residual apnea-hypopnea index of 0.9 on APAP 5-15 cm water.    She has noticed quite a bit of fatigue recently but has 3 older children and now an 8-week-old that she feeds 3-4 times each night..  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  She is studying biological anthropology and hopes to go to medical school eventually.  She generally goes to bed at 9-10 PM and gets up at 7 AM.    The patient reports improved symptoms using positive airway pressure.  Has experienced no significant problems with mask fit, mask leak, pressure tolerance, excessive airway dryness, nasal congestion, aerophagia, or chest pain.      Significant comorbidities modifying factors include obesity, insomnia, need for influenza vaccination, dyslipidemia, and never smoker.        Patient Active Problem List    Diagnosis Date Noted   • Need for influenza vaccination 11/23/2020   • Non-smoker 11/23/2020   • Obesity (BMI 30-39.9) 11/21/2019   • BABAK (obstructive sleep apnea) 08/25/2017   • Dyslipidemia 07/11/2017   • Sore throat 03/06/2017   • Obesity (BMI 35.0-39.9 without comorbidity) 03/06/2017   • Family history of MI (myocardial infarction) 09/23/2016   • Insomnia 08/20/2015   • Hair loss 08/20/2015   • Lazy eye 04/09/2015   • Stress and adjustment reaction 03/18/2015   • Vitamin D deficiency 03/18/2015   • Hip pain 03/18/2015       Past Medical History:   Diagnosis Date   • Anesthesia     PONV   • Back pain    • Depression    • Disc disorder    • Pain 12/15/2015    left hip and buttock        Past Surgical History:   Procedure Laterality Date   • HIP ARTHROSCOPY Left 12/17/2015     Procedure: HIP ARTHROSCOPY bursectomy, gluteal eval, osteoplasty, labral debridement, chondroplasty, femoral neck decompression, IT band release;  Surgeon: Obdulio Frankel M.D.;  Location: SURGERY [facility];  Service:    • DENTAL EXTRACTION(S)  2003    wisdom teeth       Family History   Problem Relation Age of Onset   • Heart Disease Mother    • Heart Disease Father    • Heart Disease Paternal Grandmother    • Heart Disease Paternal Grandfather    • Diabetes Other    • Hypertension Other        Social History     Socioeconomic History   • Marital status:      Spouse name: Not on file   • Number of children: Not on file   • Years of education: Not on file   • Highest education level: Not on file   Occupational History   • Not on file   Social Needs   • Financial resource strain: Not on file   • Food insecurity     Worry: Not on file     Inability: Not on file   • Transportation needs     Medical: Not on file     Non-medical: Not on file   Tobacco Use   • Smoking status: Never Smoker   • Smokeless tobacco: Never Used   Substance and Sexual Activity   • Alcohol use: Not Currently     Alcohol/week: 0.0 oz   • Drug use: No   • Sexual activity: Yes     Partners: Male   Lifestyle   • Physical activity     Days per week: Not on file     Minutes per session: Not on file   • Stress: Not on file   Relationships   • Social connections     Talks on phone: Not on file     Gets together: Not on file     Attends Yazidism service: Not on file     Active member of club or organization: Not on file     Attends meetings of clubs or organizations: Not on file     Relationship status: Not on file   • Intimate partner violence     Fear of current or ex partner: Not on file     Emotionally abused: Not on file     Physically abused: Not on file     Forced sexual activity: Not on file   Other Topics Concern   • Not on file   Social History Narrative   • Not on file       Current Outpatient Medications   Medication  "Sig Dispense Refill   • ibuprofen (MOTRIN) 800 MG Tab TAKE 1 TABLET BY MOUTH EVERY 8 HOURS 90 Tab 0   • Acetaminophen-Caff-Pyrilamine (MIDOL COMPLETE PO) Take  by mouth.     • albuterol 108 (90 Base) MCG/ACT Aero Soln inhalation aerosol Inhale 2 Puffs by mouth every 6 hours as needed. (Patient not taking: Reported on 11/24/2020) 8.5 g 0     No current facility-administered medications for this visit.     \"CURRENT RX\"    ALLERGIES: Patient has no known allergies.    ROS      Constitutional: Denies fever, chills, sweats,  weight loss, fatigue  Cardiovascular: Denies chest pain, tightness, palpitations, swelling in legs/feet, fainting, difficulty breathing when lying down but gets better when sitting up.   Respiratory: Denies shortness of breath, cough, sputum, wheezing, painful breathing, coughing up blood.   Sleep: per HPI  Gastrointestinal: Denies  difficulty swallowing, nausea, abdominal pain, diarrhea, constipation, heartburn.  Musculoskeletal: Denies painful joints, sore muscles, back pain.        PHYSICAL EXAM      /76 (BP Location: Left arm, Patient Position: Sitting, BP Cuff Size: Adult)   Pulse 63   Resp 16   Ht 1.651 m (5' 5\")   Wt 81.6 kg (180 lb)   SpO2 93%   BMI 29.95 kg/m²   Appearance: Well-nourished, well-developed, no acute distress  Eyes:  PERRLA, EOMI  ENMT: masked  Neck: Supple, trachea midline, no masses  Respiratory effort:  No intercostal retractions or use of accessory muscles  Lung auscultation:  No wheezes rhonchi rubs or rales  Cardiac: No murmurs, rubs, or gallops; regular rhythm, normal rate; no edema  Abdomen:  No tenderness, no organomegaly.  Musculoskeletal:  Grossly normal; gait and station normal; digits and nails normal  Skin:  No rashes, petechiae, cyanosis  Neurologic: without focal signs; oriented to person, time, place, and purpose; judgement intact  Psychiatric:  No depression, anxiety, agitation        PROBLEMS:    1. BABAK (obstructive sleep apnea)  - DME Mask and " Supplies    2. Obesity (BMI 35.0-39.9 without comorbidity)    3. Need for influenza vaccination    4. Non-smoker    5. Dyslipidemia      PLAN:     Has been advised to continue the current APAP 5-15 cm water, clean equipment frequently, and get new mask and supplies as allowed by insurance and DME. Advised about potential problems including nasal obstruction/stuffiness, mask leak or discomfort , frequent awakenings, chill or dryness of the upper airway, noise, inconvenience, and lack of benefit. Recommend an earlier appointment, if significant treatment barriers develop.    Have advised the patient to follow up with the appropriate healthcare practitioners for all other medical problems and issues.    Return in about 1 year (around 11/24/2021).

## 2020-11-24 ENCOUNTER — OFFICE VISIT (OUTPATIENT)
Dept: SLEEP MEDICINE | Facility: MEDICAL CENTER | Age: 35
End: 2020-11-24
Payer: COMMERCIAL

## 2020-11-24 VITALS
BODY MASS INDEX: 29.99 KG/M2 | RESPIRATION RATE: 16 BRPM | WEIGHT: 180 LBS | OXYGEN SATURATION: 93 % | SYSTOLIC BLOOD PRESSURE: 130 MMHG | HEIGHT: 65 IN | HEART RATE: 63 BPM | DIASTOLIC BLOOD PRESSURE: 76 MMHG

## 2020-11-24 DIAGNOSIS — E78.5 DYSLIPIDEMIA: ICD-10-CM

## 2020-11-24 DIAGNOSIS — G47.33 OSA (OBSTRUCTIVE SLEEP APNEA): ICD-10-CM

## 2020-11-24 DIAGNOSIS — Z78.9 NON-SMOKER: ICD-10-CM

## 2020-11-24 DIAGNOSIS — E66.9 OBESITY (BMI 35.0-39.9 WITHOUT COMORBIDITY): ICD-10-CM

## 2020-11-24 DIAGNOSIS — Z23 NEED FOR INFLUENZA VACCINATION: ICD-10-CM

## 2020-11-24 PROCEDURE — 99214 OFFICE O/P EST MOD 30 MIN: CPT | Performed by: INTERNAL MEDICINE

## 2021-07-02 ENCOUNTER — OFFICE VISIT (OUTPATIENT)
Dept: URGENT CARE | Facility: CLINIC | Age: 36
End: 2021-07-02
Payer: COMMERCIAL

## 2021-07-02 ENCOUNTER — HOSPITAL ENCOUNTER (OUTPATIENT)
Facility: MEDICAL CENTER | Age: 36
End: 2021-07-02
Attending: PHYSICIAN ASSISTANT
Payer: COMMERCIAL

## 2021-07-02 VITALS
RESPIRATION RATE: 16 BRPM | HEIGHT: 65 IN | HEART RATE: 93 BPM | WEIGHT: 180 LBS | SYSTOLIC BLOOD PRESSURE: 118 MMHG | BODY MASS INDEX: 29.99 KG/M2 | DIASTOLIC BLOOD PRESSURE: 76 MMHG | OXYGEN SATURATION: 97 % | TEMPERATURE: 97.4 F

## 2021-07-02 DIAGNOSIS — R05.9 COUGH: ICD-10-CM

## 2021-07-02 DIAGNOSIS — J02.0 STREP PHARYNGITIS: ICD-10-CM

## 2021-07-02 DIAGNOSIS — R09.81 NASAL CONGESTION: ICD-10-CM

## 2021-07-02 LAB
INT CON NEG: NORMAL
INT CON POS: NORMAL
S PYO AG THROAT QL: POSITIVE

## 2021-07-02 PROCEDURE — U0005 INFEC AGEN DETEC AMPLI PROBE: HCPCS

## 2021-07-02 PROCEDURE — U0003 INFECTIOUS AGENT DETECTION BY NUCLEIC ACID (DNA OR RNA); SEVERE ACUTE RESPIRATORY SYNDROME CORONAVIRUS 2 (SARS-COV-2) (CORONAVIRUS DISEASE [COVID-19]), AMPLIFIED PROBE TECHNIQUE, MAKING USE OF HIGH THROUGHPUT TECHNOLOGIES AS DESCRIBED BY CMS-2020-01-R: HCPCS

## 2021-07-02 PROCEDURE — 87880 STREP A ASSAY W/OPTIC: CPT | Performed by: PHYSICIAN ASSISTANT

## 2021-07-02 PROCEDURE — 99214 OFFICE O/P EST MOD 30 MIN: CPT | Performed by: PHYSICIAN ASSISTANT

## 2021-07-02 RX ORDER — AMOXICILLIN 500 MG/1
500 CAPSULE ORAL 2 TIMES DAILY
Qty: 20 CAPSULE | Refills: 0 | Status: SHIPPED | OUTPATIENT
Start: 2021-07-02 | End: 2021-07-12

## 2021-07-02 RX ORDER — UREA 10 %
LOTION (ML) TOPICAL
COMMUNITY
Start: 2021-03-04 | End: 2021-07-02

## 2021-07-02 ASSESSMENT — ENCOUNTER SYMPTOMS
COUGH: 1
HEADACHES: 1
CHILLS: 1
SORE THROAT: 1

## 2021-07-02 NOTE — PROGRESS NOTES
"Subjective:   Joyce Cintron  is a 36 y.o. female who presents for Sore Throat (x 7 days ), Headache, and Chills    Patient identity confirmed using two patient identifiers of last name and date of birth.    Patient presents to urgent care with 6 days of sore throat, fatigue, body aches, headache, mild congestion and mild cough. Also with chills. No known exposure to illness.       Patient is fully covid vaccinated.     Headache   Associated symptoms include coughing and a sore throat.   Chills  Associated symptoms include chills, congestion, coughing, headaches and a sore throat.     Review of Systems   Constitutional: Positive for chills and malaise/fatigue.   HENT: Positive for congestion and sore throat.    Respiratory: Positive for cough.    Neurological: Positive for headaches.   All other systems reviewed and are negative.    No Known Allergies  Reviewed past medical, surgical , social and family history.  Reviewed prescription and over-the-counter medications with patient and electronic health record today.     Objective:   /76   Pulse 93   Temp 36.3 °C (97.4 °F) (Temporal)   Resp 16   Ht 1.651 m (5' 5\")   Wt 81.6 kg (180 lb)   SpO2 97%   Breastfeeding Yes Comment: breastfeeding no menstrual   BMI 29.95 kg/m²   Physical Exam  Vitals reviewed.   Constitutional:       General: She is not in acute distress.     Appearance: She is well-developed. She is not ill-appearing or toxic-appearing.   HENT:      Head: Normocephalic and atraumatic.      Right Ear: Tympanic membrane, ear canal and external ear normal.      Left Ear: Tympanic membrane, ear canal and external ear normal.      Nose: Mucosal edema and congestion present.      Right Turbinates: Swollen.      Left Turbinates: Swollen.      Right Sinus: No maxillary sinus tenderness or frontal sinus tenderness.      Left Sinus: No maxillary sinus tenderness or frontal sinus tenderness.      Mouth/Throat:      Lips: Pink. No " lesions.      Mouth: Mucous membranes are moist.      Pharynx: Oropharynx is clear. Uvula midline. Posterior oropharyngeal erythema present. No oropharyngeal exudate.      Tonsils: No tonsillar exudate. 1+ on the right. 1+ on the left.   Eyes:      General: Lids are normal.      Extraocular Movements: Extraocular movements intact.      Conjunctiva/sclera: Conjunctivae normal.      Pupils: Pupils are equal, round, and reactive to light.   Cardiovascular:      Rate and Rhythm: Normal rate and regular rhythm.      Heart sounds: Normal heart sounds. No murmur heard.   No friction rub. No gallop.    Pulmonary:      Effort: Pulmonary effort is normal. No respiratory distress.      Breath sounds: Normal breath sounds.   Abdominal:      General: Bowel sounds are normal. There is no distension.      Palpations: Abdomen is soft. There is no mass.      Tenderness: There is no abdominal tenderness. There is no guarding or rebound.   Musculoskeletal:         General: No tenderness or deformity. Normal range of motion.      Cervical back: Normal range of motion and neck supple.   Lymphadenopathy:      Head:      Right side of head: No submental, submandibular or tonsillar adenopathy.      Left side of head: No submental, submandibular or tonsillar adenopathy.      Cervical: No cervical adenopathy.      Upper Body:      Right upper body: No supraclavicular adenopathy.      Left upper body: No supraclavicular adenopathy.   Skin:     General: Skin is warm and dry.      Findings: No rash.   Neurological:      Mental Status: She is alert and oriented to person, place, and time.      Cranial Nerves: Cranial nerves are intact. No cranial nerve deficit.      Sensory: Sensation is intact. No sensory deficit.      Motor: Motor function is intact.      Coordination: Coordination is intact. Coordination normal.      Gait: Gait is intact.   Psychiatric:         Attention and Perception: Attention normal.         Mood and Affect: Mood and  affect normal.         Speech: Speech normal.         Behavior: Behavior normal. Behavior is cooperative.         Thought Content: Thought content normal.         Judgment: Judgment normal.           Assessment/Plan:   1. Strep pharyngitis  - amoxicillin (AMOXIL) 500 MG Cap; Take 1 capsule by mouth 2 times a day for 10 days.  Dispense: 20 capsule; Refill: 0  - SARS-CoV-2 PCR (24 hour In-House): Collect NP swab in VTM; Future  - POCT Rapid Strep A    2. Cough  - SARS-CoV-2 PCR (24 hour In-House): Collect NP swab in VTM; Future  - POCT Rapid Strep A    3. Nasal congestion  - SARS-CoV-2 PCR (24 hour In-House): Collect NP swab in VTM; Future  - POCT Rapid Strep A    Amoxicillin as above.   Change Toothbrush.   Contagion reviewed.   Increase fluids, rest.  Patient may use salt water gargles, ice pops, cool fluids.        Testing performed for COVID-19.  Patient/guardian is given printed /MyChart instructions regarding self-isolation.  Work/school note is provided with specific return to work/school protocols.  Reviewed with patient/guardian that if they do test positive they will be contacted by their local health department regarding return to work/school protocols.  Results will also be released to patient/guardian in MyChart or called to the patient/guardian directly.  Encouraged mask use, frequent handwashing, wiping down hard surfaces, etc.    Differential diagnosis, natural history, supportive care, and indications for immediate follow-up discussed.     Red flag warning symptoms and strict ER/follow-up precautions given.  The patient demonstrated a good understanding and agreed with the treatment plan.    Upon entering exam room I ensured patient was wearing a mask.  This provider wore appropriate PPE throughout entire visit.  Patient wore mask entire visit except for a brief period while examining oropharynx.    Please note that this note was created using voice recognition speech to text software. Every effort  has been made to correct obvious errors.  However, I expect there are errors of grammar and possibly context that were not discovered prior to finalizing the note  SMarcial Coats PA-C

## 2021-07-03 LAB
COVID ORDER STATUS COVID19: NORMAL
SARS-COV-2 RNA RESP QL NAA+PROBE: NOTDETECTED
SPECIMEN SOURCE: NORMAL

## 2021-11-02 ENCOUNTER — HOSPITAL ENCOUNTER (OUTPATIENT)
Facility: MEDICAL CENTER | Age: 36
End: 2021-11-02
Attending: PHYSICIAN ASSISTANT
Payer: COMMERCIAL

## 2021-11-02 ENCOUNTER — OFFICE VISIT (OUTPATIENT)
Dept: URGENT CARE | Facility: CLINIC | Age: 36
End: 2021-11-02
Payer: COMMERCIAL

## 2021-11-02 VITALS
BODY MASS INDEX: 29.99 KG/M2 | HEIGHT: 65 IN | SYSTOLIC BLOOD PRESSURE: 118 MMHG | OXYGEN SATURATION: 98 % | HEART RATE: 82 BPM | TEMPERATURE: 97 F | DIASTOLIC BLOOD PRESSURE: 84 MMHG | WEIGHT: 180 LBS | RESPIRATION RATE: 16 BRPM

## 2021-11-02 DIAGNOSIS — J01.00 ACUTE NON-RECURRENT MAXILLARY SINUSITIS: ICD-10-CM

## 2021-11-02 DIAGNOSIS — J02.9 PHARYNGITIS, UNSPECIFIED ETIOLOGY: ICD-10-CM

## 2021-11-02 LAB
INT CON NEG: NORMAL
INT CON POS: NORMAL
S PYO AG THROAT QL: NEGATIVE

## 2021-11-02 PROCEDURE — U0005 INFEC AGEN DETEC AMPLI PROBE: HCPCS

## 2021-11-02 PROCEDURE — 87880 STREP A ASSAY W/OPTIC: CPT | Performed by: PHYSICIAN ASSISTANT

## 2021-11-02 PROCEDURE — U0003 INFECTIOUS AGENT DETECTION BY NUCLEIC ACID (DNA OR RNA); SEVERE ACUTE RESPIRATORY SYNDROME CORONAVIRUS 2 (SARS-COV-2) (CORONAVIRUS DISEASE [COVID-19]), AMPLIFIED PROBE TECHNIQUE, MAKING USE OF HIGH THROUGHPUT TECHNOLOGIES AS DESCRIBED BY CMS-2020-01-R: HCPCS

## 2021-11-02 PROCEDURE — 99214 OFFICE O/P EST MOD 30 MIN: CPT | Performed by: PHYSICIAN ASSISTANT

## 2021-11-02 RX ORDER — AMOXICILLIN AND CLAVULANATE POTASSIUM 875; 125 MG/1; MG/1
1 TABLET, FILM COATED ORAL 2 TIMES DAILY
Qty: 14 TABLET | Refills: 0 | Status: SHIPPED | OUTPATIENT
Start: 2021-11-02 | End: 2021-11-09

## 2021-11-02 RX ORDER — FLUTICASONE PROPIONATE 50 MCG
1 SPRAY, SUSPENSION (ML) NASAL DAILY
Qty: 16 G | Refills: 0 | Status: SHIPPED | OUTPATIENT
Start: 2021-11-02 | End: 2023-01-09

## 2021-11-02 ASSESSMENT — ENCOUNTER SYMPTOMS
SORE THROAT: 1
RHINORRHEA: 1
SWOLLEN GLANDS: 0
HEADACHES: 0
VOMITING: 0
COUGH: 1
SINUS PAIN: 1

## 2021-11-02 NOTE — PROGRESS NOTES
Subjective:   Joyce Cintron is a 36 y.o. female who presents for Sore Throat (x8 days, possible strep throat ) and Ear Pain (x2 days, left ear pain )        URI   This is a new problem. The current episode started 1 to 4 weeks ago. The problem has been gradually worsening. There has been no fever. Associated symptoms include congestion, coughing, ear pain (left ear ), rhinorrhea, sinus pain and a sore throat (mild pain with swallowing, pain unchanged). Pertinent negatives include no headaches, sneezing, swollen glands or vomiting. She has tried decongestant, NSAIDs and acetaminophen for the symptoms. The treatment provided mild relief.     Review of Systems   HENT: Positive for congestion, ear pain (left ear ), rhinorrhea, sinus pain and sore throat (mild pain with swallowing, pain unchanged). Negative for sneezing.    Respiratory: Positive for cough.    Gastrointestinal: Negative for vomiting.   Neurological: Negative for headaches.       PMH:  has a past medical history of Anesthesia, Back pain, Depression, Disc disorder, and Pain (12/15/2015).  MEDS:   Current Outpatient Medications:   •  amoxicillin-clavulanate (AUGMENTIN) 875-125 MG Tab, Take 1 Tablet by mouth 2 times a day for 7 days., Disp: 14 Tablet, Rfl: 0  •  fluticasone (FLONASE) 50 MCG/ACT nasal spray, Administer 1 Spray into affected nostril(S) every day., Disp: 16 g, Rfl: 0  •  ibuprofen (MOTRIN) 800 MG Tab, TAKE 1 TABLET BY MOUTH EVERY 8 HOURS, Disp: 90 Tab, Rfl: 0  ALLERGIES: No Known Allergies  SURGHX:   Past Surgical History:   Procedure Laterality Date   • HIP ARTHROSCOPY Left 12/17/2015    Procedure: HIP ARTHROSCOPY bursectomy, gluteal eval, osteoplasty, labral debridement, chondroplasty, femoral neck decompression, IT band release;  Surgeon: Obdulio Frankel M.D.;  Location: SURGERY [facility];  Service:    • DENTAL EXTRACTION(S)  2003    wisdom teeth     SOCHX:  reports that she has never smoked. She has never  "used smokeless tobacco. She reports previous alcohol use. She reports that she does not use drugs.  FH: Family history was reviewed, no pertinent findings to report   Objective:   /84   Pulse 82   Temp 36.1 °C (97 °F) (Temporal)   Resp 16   Ht 1.651 m (5' 5\")   Wt 81.6 kg (180 lb)   SpO2 98%   BMI 29.95 kg/m²   Physical Exam  Vitals reviewed.   Constitutional:       General: She is not in acute distress.     Appearance: Normal appearance. She is well-developed. She is not toxic-appearing.   HENT:      Head: Normocephalic and atraumatic.      Right Ear: Tympanic membrane, ear canal and external ear normal. Tympanic membrane is not erythematous or bulging. Tympanic membrane has normal mobility.      Left Ear: Tympanic membrane, ear canal and external ear normal. Tympanic membrane is not erythematous or bulging. Tympanic membrane has normal mobility.      Nose: Mucosal edema, congestion and rhinorrhea present. Rhinorrhea is clear.      Right Sinus: Maxillary sinus tenderness present.      Left Sinus: Maxillary sinus tenderness present.      Mouth/Throat:      Lips: Pink.      Mouth: Mucous membranes are moist.      Pharynx: Oropharynx is clear. Uvula midline. Posterior oropharyngeal erythema present.      Tonsils: No tonsillar exudate or tonsillar abscesses. 1+ on the right. 2+ on the left.   Eyes:      General: Gaze aligned appropriately.   Cardiovascular:      Rate and Rhythm: Normal rate and regular rhythm.      Heart sounds: Normal heart sounds, S1 normal and S2 normal.   Pulmonary:      Effort: Pulmonary effort is normal. No respiratory distress.      Breath sounds: Normal breath sounds. No stridor. No decreased breath sounds, wheezing, rhonchi or rales.   Musculoskeletal:      Cervical back: Neck supple.   Lymphadenopathy:      Cervical: No cervical adenopathy.      Right cervical: No superficial or posterior cervical adenopathy.     Left cervical: No superficial or posterior cervical adenopathy. "   Skin:     General: Skin is warm and dry.      Capillary Refill: Capillary refill takes less than 2 seconds.   Neurological:      Mental Status: She is alert and oriented to person, place, and time.      Comments: CN2-12 grossly intact   Psychiatric:         Speech: Speech normal.         Behavior: Behavior normal.           Assessment/Plan:   1. Acute non-recurrent maxillary sinusitis  - amoxicillin-clavulanate (AUGMENTIN) 875-125 MG Tab; Take 1 Tablet by mouth 2 times a day for 7 days.  Dispense: 14 Tablet; Refill: 0  - fluticasone (FLONASE) 50 MCG/ACT nasal spray; Administer 1 Spray into affected nostril(S) every day.  Dispense: 16 g; Refill: 0  - COVID/SARS CoV-2 PCR; Future    2. Pharyngitis, unspecified etiology  - COVID/SARS CoV-2 PCR; Future    Patient presents with upper respiratory symptoms that have been worsening for the last 8 to 9 days. No improvement with appropriate OTC medications. We will start patient on nasal saline rinses twice daily and Flonase once a day. If no improvement despite these medications I recommend she begin antibiotic as prescribed. She should continue Flonase and nasal saline regardless. If no improvement within 72 hours despite medication, new symptoms develop, symptoms worsen follow-up with PCP or return to clinic for reevaluation. As a precaution we will screen for Covid and patient will quarantine in accordance with CDC guidelines.    Differential diagnosis, natural history, supportive care, and indications for immediate follow-up discussed.

## 2021-11-03 DIAGNOSIS — J01.00 ACUTE NON-RECURRENT MAXILLARY SINUSITIS: ICD-10-CM

## 2021-11-03 DIAGNOSIS — J02.9 PHARYNGITIS, UNSPECIFIED ETIOLOGY: ICD-10-CM

## 2021-11-03 LAB — COVID ORDER STATUS COVID19: NORMAL

## 2021-11-04 LAB
SARS-COV-2 RNA RESP QL NAA+PROBE: NOTDETECTED
SPECIMEN SOURCE: NORMAL

## 2022-05-02 ENCOUNTER — HOSPITAL ENCOUNTER (OUTPATIENT)
Facility: MEDICAL CENTER | Age: 37
End: 2022-05-02
Attending: NURSE PRACTITIONER
Payer: COMMERCIAL

## 2022-05-02 ENCOUNTER — EH NON-PROVIDER (OUTPATIENT)
Dept: OCCUPATIONAL MEDICINE | Facility: CLINIC | Age: 37
End: 2022-05-02

## 2022-05-02 DIAGNOSIS — Z02.89 VISIT FOR OCCUPATIONAL HEALTH EXAMINATION: Primary | ICD-10-CM

## 2022-05-02 DIAGNOSIS — Z02.89 VISIT FOR OCCUPATIONAL HEALTH EXAMINATION: ICD-10-CM

## 2022-05-02 PROCEDURE — 86480 TB TEST CELL IMMUN MEASURE: CPT | Performed by: NURSE PRACTITIONER

## 2022-05-02 PROCEDURE — 86787 VARICELLA-ZOSTER ANTIBODY: CPT | Performed by: NURSE PRACTITIONER

## 2022-05-03 LAB — VZV IGG SER IA-ACNC: 1.1

## 2022-05-04 LAB
GAMMA INTERFERON BACKGROUND BLD IA-ACNC: 0.03 IU/ML
M TB IFN-G BLD-IMP: NEGATIVE
M TB IFN-G CD4+ BCKGRND COR BLD-ACNC: -0.01 IU/ML
MITOGEN IGNF BCKGRD COR BLD-ACNC: >10 IU/ML
QFT TB2 - NIL TBQ2: 0 IU/ML

## 2022-07-21 ENCOUNTER — TELEPHONE (OUTPATIENT)
Dept: OCCUPATIONAL MEDICINE | Facility: CLINIC | Age: 37
End: 2022-07-21
Payer: COMMERCIAL

## 2022-07-26 ENCOUNTER — TELEPHONE (OUTPATIENT)
Dept: OCCUPATIONAL MEDICINE | Facility: CLINIC | Age: 37
End: 2022-07-26
Payer: COMMERCIAL

## 2022-07-26 NOTE — TELEPHONE ENCOUNTER
Spoke to Joyce regarding vaccine requirements for Volunteering at [facility]. She is in need of a second dose of MMR and an updated dose of Tdap. She states that she has that information and will send the records via e-mail when she gets home. She stated she has my contact information.

## 2022-07-29 ENCOUNTER — EH NON-PROVIDER (OUTPATIENT)
Dept: OCCUPATIONAL MEDICINE | Facility: CLINIC | Age: 37
End: 2022-07-29

## 2022-08-05 ENCOUNTER — OFFICE VISIT (OUTPATIENT)
Dept: URGENT CARE | Facility: CLINIC | Age: 37
End: 2022-08-05
Payer: COMMERCIAL

## 2022-08-05 ENCOUNTER — APPOINTMENT (OUTPATIENT)
Dept: RADIOLOGY | Facility: IMAGING CENTER | Age: 37
End: 2022-08-05
Attending: STUDENT IN AN ORGANIZED HEALTH CARE EDUCATION/TRAINING PROGRAM
Payer: COMMERCIAL

## 2022-08-05 VITALS
TEMPERATURE: 97.9 F | DIASTOLIC BLOOD PRESSURE: 74 MMHG | SYSTOLIC BLOOD PRESSURE: 114 MMHG | HEART RATE: 80 BPM | HEIGHT: 65 IN | RESPIRATION RATE: 16 BRPM | WEIGHT: 191.4 LBS | OXYGEN SATURATION: 97 % | BODY MASS INDEX: 31.89 KG/M2

## 2022-08-05 DIAGNOSIS — M79.645 THUMB PAIN, LEFT: ICD-10-CM

## 2022-08-05 PROCEDURE — 73130 X-RAY EXAM OF HAND: CPT | Mod: TC,FY,LT | Performed by: STUDENT IN AN ORGANIZED HEALTH CARE EDUCATION/TRAINING PROGRAM

## 2022-08-05 PROCEDURE — 99213 OFFICE O/P EST LOW 20 MIN: CPT | Performed by: STUDENT IN AN ORGANIZED HEALTH CARE EDUCATION/TRAINING PROGRAM

## 2022-08-05 RX ORDER — ESCITALOPRAM OXALATE 10 MG/1
10 TABLET ORAL
COMMUNITY
Start: 2022-07-16 | End: 2023-11-06

## 2022-08-05 RX ORDER — ESCITALOPRAM OXALATE 5 MG/1
TABLET ORAL
COMMUNITY
Start: 2022-07-16 | End: 2023-01-09

## 2022-08-05 NOTE — PROGRESS NOTES
"Subjective:   CHIEF COMPLAINT  Chief Complaint   Patient presents with   • Hand Injury     8/1 Left thumb, \"while working out, I was doing a plank, my daughter ran into my hand hurting my thumb. The pain has not gone away, and is radiating down my bone to my wrist\"       [facility]  Joyce Cintron is a 37 y.o. female who presents with a chief complaint of left thumb pain x4 days.  Patient says on Monday she was exercising, doing planks on the floor in her 12-year-old daughter rolled over underhand and patient subsequently developed something.  She is uncertain the exact mechanism of injury.  Reports there was some swelling but no bruising.  Since that time she has been experiencing pain along the base of the thumb which is aggravated with pushing and pulling.  She tried taking some NSAIDs which she is uncertain if this provided any relief.  No previous history of additional trauma or injury to her left thumb.    REVIEW OF SYSTEMS  General: no fever or chills  GI: no nausea or vomiting  See HPI for further details.    PAST MEDICAL HISTORY   has a past medical history of Anesthesia, Back pain, Depression, Disc disorder, and Pain (12/15/2015).    SURGICAL HISTORY   has a past surgical history that includes dental extraction(s) (2003) and hip arthroscopy (Left, 12/17/2015).    ALLERGIES  No Known Allergies    CURRENT MEDICATIONS  Home Medications     Reviewed by Jaswinder Hernandez D.O. (Physician) on 08/05/22 at 1255  Med List Status: <None>   Medication Last Dose Status   escitalopram (LEXAPRO) 10 MG Tab Taking Active   escitalopram (LEXAPRO) 5 MG tablet Taking Active   fluticasone (FLONASE) 50 MCG/ACT nasal spray  Flagged for Removal   ibuprofen (MOTRIN) 800 MG Tab PRN Active                SOCIAL HISTORY  Social History     Tobacco Use   • Smoking status: Never Smoker   • Smokeless tobacco: Never Used   Vaping Use   • Vaping Use: Never used   Substance and Sexual Activity   • Alcohol use: Not Currently " "    Alcohol/week: 0.0 oz   • Drug use: No   • Sexual activity: Yes     Partners: Male       FAMILY HISTORY  Family History   Problem Relation Age of Onset   • Heart Disease Mother    • Heart Disease Father    • Heart Disease Paternal Grandmother    • Heart Disease Paternal Grandfather    • Diabetes Other    • Hypertension Other           Objective:   PHYSICAL EXAM  VITAL SIGNS: /74 (BP Location: Right arm, Patient Position: Sitting, BP Cuff Size: Adult)   Pulse 80   Temp 36.6 °C (97.9 °F) (Temporal)   Resp 16   Ht 1.651 m (5' 5\")   Wt 86.8 kg (191 lb 6.4 oz)   LMP 07/22/2022 (Approximate)   SpO2 97%   Breastfeeding No   BMI 31.85 kg/m²     Gen: no acute distress, normal voice  Psych: normal affect, normal judgement, alert, awake  Skin: dry, intact, moist mucosal membranes  Lungs: CTAB w/ symmetric expansion  CV: RRR w/o murmurs or clicks  MSK: Left thumb: No erythema, ecchymosis or edema.  Full ROM with opposition, flexion, extension, adduction and abduction.  Poorly localized tenderness palpation along the first metacarpal phalangeal joint.  No obvious laxity with valgus stress of the UCL.      RADIOLOGY RESULTS   DX-HAND 3+ LEFT    Result Date: 8/5/2022 8/5/2022 1:11 PM HISTORY/REASON FOR EXAM:  Left hand pain proximal first digit 4 days after injury. TECHNIQUE/EXAM DESCRIPTION AND NUMBER OF VIEWS:  3 views of the LEFT hand. COMPARISON: None FINDINGS: Bone mineralization is age appropriate. Bony alignment is anatomic. There is no evidence of acute fracture or dislocation.     No radiographic evidence of acute traumatic injury.           Assessment/Plan:     1. Thumb pain, left  DX-HAND 3+ LEFT   X-rays negative for any acute osseous abnormalities.  No obvious laxity of the UCL with valgus stress.  - Placed in thumb spica  - Motrin/Tylenol for symptomatic relief  -Ordered referral to follow-up sports medicine for reevaluation and management  - Return to urgent care any new/worsening symptoms or " further questions or concerns.  Patient understood everything discussed.  All questions were answered.      Differential diagnosis, natural history, supportive care, and indications for immediate follow-up discussed. All questions answered. Patient agrees with the plan of care.    Follow-up as needed if symptoms worsen or fail to improve to PCP, Urgent care or Emergency Room.    Please note that this dictation was created using voice recognition software. I have made a reasonable attempt to correct obvious errors, but I expect that there are errors of grammar and possibly content that I did not discover before finalizing the note.

## 2023-01-06 ENCOUNTER — TELEPHONE (OUTPATIENT)
Dept: SLEEP MEDICINE | Facility: MEDICAL CENTER | Age: 38
End: 2023-01-06
Payer: COMMERCIAL

## 2023-01-07 NOTE — TELEPHONE ENCOUNTER
SLEEP - ESTABLISHED PT CHART PREP COMPLETED ON 01/06/23     SCHEDULED FOLLOW UP 01/09/2023    Last Office Visit 11/24/2020 With Dr. Berry    1st Compliance N/A    Hx of Sleep Studies: 08/22/2017-POLYSOMNOGRAPHY    DME:DME:  Qiana Rubio ph [phone] / carlos [phone]    Settings: autoCPAP 5-15 cmH2O     Wireless Data? NO    Compliance in Media? NO NOT WIRELESS

## 2023-01-09 ENCOUNTER — OFFICE VISIT (OUTPATIENT)
Dept: SLEEP MEDICINE | Facility: MEDICAL CENTER | Age: 38
End: 2023-01-09
Payer: COMMERCIAL

## 2023-01-09 VITALS
OXYGEN SATURATION: 96 % | BODY MASS INDEX: 33.15 KG/M2 | DIASTOLIC BLOOD PRESSURE: 64 MMHG | HEIGHT: 65 IN | HEART RATE: 76 BPM | WEIGHT: 199 LBS | SYSTOLIC BLOOD PRESSURE: 126 MMHG

## 2023-01-09 DIAGNOSIS — G47.33 OSA (OBSTRUCTIVE SLEEP APNEA): ICD-10-CM

## 2023-01-09 DIAGNOSIS — G47.00 INSOMNIA, UNSPECIFIED TYPE: ICD-10-CM

## 2023-01-09 PROCEDURE — 99213 OFFICE O/P EST LOW 20 MIN: CPT | Performed by: NURSE PRACTITIONER

## 2023-01-09 ASSESSMENT — PATIENT HEALTH QUESTIONNAIRE - PHQ9: CLINICAL INTERPRETATION OF PHQ2 SCORE: 0

## 2023-01-09 NOTE — PROGRESS NOTES
Chief Complaint   Patient presents with    Apnea     Last Office Visit 11/24/2020 With Dr. Berry       HPI:      Mrs. Cintron is a 37 y/o female patient who is in today for BABAK f/u. PMH includes dyslipidemia, BABAK, obesity, never smoker, depression.    Patient was set up with a ResMed auto CPAP machine on 9/20/2017.  Patient is eligible for a new machine.  Patient is accompanied by her young son.  Compliance report from 12/10/22-1/8/23 was downloaded and reviewed with the patient which showed autoCPAP 5-15 cmH2O, 97% compliance, 8 hrs 57 min use, AHI of 1.2. She is tolerating the pressure and mask well. She goes to bed between 10:30 pm- 12 am and wakes up between 7-8 am. She denies morning headache or snoring.  Patient is sleeping well and does not have trouble initiating or maintaining sleep.  She feels since being on CPAP therapy her insomnia has been stable.  She also sleeps better when she gets regular exercise.  She has however not noted much weight loss in spite of regular exercise and plans to follow-up with endocrinology to further review.  She otherwise denies any new health problems.    Sleep Study History:   PSG study from 8/21/17 indicated moderate obstructive sleep apnea hypopnea with an apnea hypopnea index of 22.5 events per hour and a lowest arterial oxygen saturation of 92% on room air. The heart rate varies from  bpm.    ROS:    Constitutional: Denies fevers, Denies weight changes  Eyes: Denies changes in vision, no eye pain  Ears/Nose/Throat/Mouth: Denies nasal congestion or sore throat   Cardiovascular: Denies chest pain or palpitations   Respiratory: Denies shortness of breath , Denies cough  Gastrointestinal/Hepatic: Denies abdominal pain, nausea, vomiting,   Skin/Breast: Denies rash,   Neurological: Denies headache, confusion,   Psychiatric: denies mood disorder   Sleep: denies snoring       Past Medical History:   Diagnosis Date    Anesthesia     PONV    Back pain     Depression      Disc disorder     Pain 12/15/2015    left hip and buttock       Past Surgical History:   Procedure Laterality Date    HIP ARTHROSCOPY Left 12/17/2015    Procedure: HIP ARTHROSCOPY bursectomy, gluteal eval, osteoplasty, labral debridement, chondroplasty, femoral neck decompression, IT band release;  Surgeon: Obdulio Frankel M.D.;  Location: SURGERY [facility];  Service:     DENTAL EXTRACTION(S)  2003    wisdom teeth       Family History   Problem Relation Age of Onset    Heart Disease Mother     Heart Disease Father     Heart Disease Paternal Grandmother     Heart Disease Paternal Grandfather     Diabetes Other     Hypertension Other        Social History     Socioeconomic History    Marital status:      Spouse name: Not on file    Number of children: Not on file    Years of education: Not on file    Highest education level: Not on file   Occupational History    Not on file   Tobacco Use    Smoking status: Never    Smokeless tobacco: Never   Vaping Use    Vaping Use: Never used   Substance and Sexual Activity    Alcohol use: Not Currently     Alcohol/week: 0.0 oz    Drug use: No    Sexual activity: Yes     Partners: Male   Other Topics Concern    Not on file   Social History Narrative    Not on file     Social Determinants of Health     Financial Resource Strain: Not on file   Food Insecurity: Not on file   Transportation Needs: Not on file   Physical Activity: Not on file   Stress: Not on file   Social Connections: Not on file   Intimate Partner Violence: Not on file   Housing Stability: Not on file       Allergies as of 01/09/2023    (No Known Allergies)        Vitals:  Vitals:    01/09/23 1019   BP: 126/64   Pulse: 76   SpO2: 96%       Current medications as of today   Current Outpatient Medications   Medication Sig Dispense Refill    escitalopram (LEXAPRO) 10 MG Tab Take 10 mg by mouth every day.       No current facility-administered medications for this visit.         Physical Exam: Limited  by COVID-19 precautions.  Appearance: Well developed, well nourished, no acute distress  Eyes: PERRL, EOM intact, sclera white, conjunctiva moist  Ears: no lesions or deformities  Hearing: grossly intact  Nose: no lesions or deformities  Respiratory effort: no intercostal retractions or use of accessory muscles  Extremities: no cyanosis or edema  Abdomen: soft   Gait and Station: normal  Digits and nails: no clubbing, cyanosis, petechiae or nodes.  Cranial nerves: grossly intact  Skin: no visible rashes, lesions or ulcers noted  Orientation: Oriented to time, person and place  Mood and affect: mood and affect appropriate, normal interaction with examiner  Judgement: Intact    Assessment:  1. BABAK (obstructive sleep apnea)  DME Mask and Supplies    DME CPAP      2. Insomnia, unspecified type        3. BMI 33.0-33.9,adult  Patient identified as having weight management issue.  Appropriate orders and counseling given.            Plan  Discussed the cardiovascular and neuropsychiatric risks of untreated BABAK; including but not limited to: HTN, DM, MI, ASCVD, CVA, CHF, traffic accidents.     1. Compliance report from 12/10/22-1/8/23 was downloaded and reviewed with the patient which showed autoCPAP 5-15 cmH2O, 97% compliance, 8 hrs 57 min use, AHI of 1.2.  Patient is compliant and benefiting from autoCPAP therapy for management of BABAK. Advised patient to continue to use the autoCPAP every night for more than four hours for optimal health benefit.    *DME order ([facility]) for new ResMed autoCPAP 5-15 cmH2O, mask (medium dreamwear nasal mask or MOC) and supplies was placed today. Continue to clean mask and supplies weekly with soap and water, and change supplies per insurance guidelines.  A separate order for supplies is also placed.    2. Sleep hygiene discussed. Recommend keeping a set sleep/wake schedule. Logging enough hours of sleep. Limiting/Avoiding naps. No caffeine after noon and no heavy meals in the evening.    Chronic insomnia: stable.   3. Encouraged a healthy diet and exercise to help with weight loss and management.   If your BMI is 25-29.9 you are overweight. If your BMI is 30 or greater you are obese. To lose weight eat less, move more, or both. Any diet that reduces caloric intake can help with weight loss. Extra weight may reduce your lifespan. Avoid dramatic unsustainable dietary changes that result in the yo-yo effect (down then back up.)  Usually small modifications in diet and exercise are easier to stick with.  4. Follow up with appropriate healthcare providers for all other medical problems.  5. F/u in 4 months for first compliance, BABAK.       MONTRELL Sexton.      This dictation was created using voice recognition software. The accuracy of the dictation is limited to the abilities of the software. I expect there may be some errors of grammar and possibly content.

## 2023-02-05 ENCOUNTER — OFFICE VISIT (OUTPATIENT)
Dept: URGENT CARE | Facility: CLINIC | Age: 38
End: 2023-02-05
Payer: COMMERCIAL

## 2023-02-05 VITALS
OXYGEN SATURATION: 95 % | BODY MASS INDEX: 29.99 KG/M2 | TEMPERATURE: 98.1 F | SYSTOLIC BLOOD PRESSURE: 104 MMHG | HEART RATE: 76 BPM | HEIGHT: 65 IN | WEIGHT: 180 LBS | DIASTOLIC BLOOD PRESSURE: 70 MMHG | RESPIRATION RATE: 16 BRPM

## 2023-02-05 DIAGNOSIS — J02.0 PHARYNGITIS DUE TO STREPTOCOCCUS SPECIES: ICD-10-CM

## 2023-02-05 DIAGNOSIS — Z20.818 EXPOSURE TO STREP THROAT: ICD-10-CM

## 2023-02-05 LAB
INT CON NEG: ABNORMAL
INT CON POS: ABNORMAL
S PYO AG THROAT QL: POSITIVE

## 2023-02-05 PROCEDURE — 99213 OFFICE O/P EST LOW 20 MIN: CPT

## 2023-02-05 PROCEDURE — 87880 STREP A ASSAY W/OPTIC: CPT

## 2023-02-05 RX ORDER — AMOXICILLIN 500 MG/1
500 CAPSULE ORAL 2 TIMES DAILY
Qty: 20 CAPSULE | Refills: 0 | Status: SHIPPED | OUTPATIENT
Start: 2023-02-05 | End: 2023-02-15

## 2023-02-05 ASSESSMENT — ENCOUNTER SYMPTOMS
WHEEZING: 0
FEVER: 0
COUGH: 0
STRIDOR: 0
CHILLS: 0
SINUS PAIN: 0
SORE THROAT: 1
SHORTNESS OF BREATH: 0
MYALGIAS: 0
SPUTUM PRODUCTION: 0

## 2023-02-05 NOTE — PROGRESS NOTES
"Subjective:   Joyce Cintron is a 38 y.o. female who presents for Pharyngitis (\"Started Tuesday, Fatigue, Recently exposed to strep throat\")      HPI: This is a 38-year-old female who presents today for evaluation of sore throat and fatigue.  Patient reports sore throat developed 5 days ago.  She reports pain is 3\10.  She does report recent strep exposure from her .  Patient reports that she has started taking her 's prescription of amoxicillin.  She has taken 1 days worth of this.  No fevers.  She does have history of strep throat with similar presentation.      Review of Systems   Constitutional:  Positive for malaise/fatigue. Negative for chills and fever.   HENT:  Positive for sore throat. Negative for congestion, ear pain and sinus pain.    Respiratory:  Negative for cough, sputum production, shortness of breath, wheezing and stridor.    Musculoskeletal:  Negative for myalgias.   All other systems reviewed and are negative.    Medications:    Current Outpatient Medications on File Prior to Visit   Medication Sig Dispense Refill    escitalopram (LEXAPRO) 10 MG Tab Take 10 mg by mouth every day.       No current facility-administered medications on file prior to visit.        Allergies:   Patient has no known allergies.    Problem List:   Patient Active Problem List   Diagnosis    Stress and adjustment reaction    Vitamin D deficiency    Hip pain    Lazy eye    Insomnia    Hair loss    Family history of MI (myocardial infarction)    Sore throat    Obesity (BMI 35.0-39.9 without comorbidity)    Dyslipidemia    BABAK (obstructive sleep apnea)    Obesity (BMI 30-39.9)    Need for influenza vaccination    Non-smoker        Surgical History:  Past Surgical History:   Procedure Laterality Date    HIP ARTHROSCOPY Left 12/17/2015    Procedure: HIP ARTHROSCOPY bursectomy, gluteal eval, osteoplasty, labral debridement, chondroplasty, femoral neck decompression, IT band release;  Surgeon: " "Obdulio Frankel M.D.;  Location: SURGERY [facility];  Service:     DENTAL EXTRACTION(S)  2003    wisdom teeth       Past Social Hx:   Social History     Tobacco Use    Smoking status: Never    Smokeless tobacco: Never   Vaping Use    Vaping Use: Never used   Substance Use Topics    Alcohol use: Not Currently     Alcohol/week: 0.0 oz    Drug use: No          Problem list, medications, and allergies reviewed by myself today in Epic.     Objective:     /70 (BP Location: Left arm, Patient Position: Sitting, BP Cuff Size: Adult)   Pulse 76   Temp 36.7 °C (98.1 °F) (Temporal)   Resp 16   Ht 1.651 m (5' 5\")   Wt 81.6 kg (180 lb)   SpO2 95%   BMI 29.95 kg/m²     Physical Exam  Vitals and nursing note reviewed.   Constitutional:       General: She is not in acute distress.     Appearance: Normal appearance. She is normal weight. She is not ill-appearing, toxic-appearing or diaphoretic.   HENT:      Head: Normocephalic and atraumatic.      Right Ear: Tympanic membrane, ear canal and external ear normal. There is no impacted cerumen.      Left Ear: Tympanic membrane, ear canal and external ear normal. There is no impacted cerumen.      Nose: Nose normal. No congestion or rhinorrhea.      Mouth/Throat:      Mouth: Mucous membranes are moist.      Pharynx: Oropharynx is clear. Uvula midline. Posterior oropharyngeal erythema present. No oropharyngeal exudate.      Tonsils: No tonsillar exudate. 0 on the right. 0 on the left.   Cardiovascular:      Rate and Rhythm: Normal rate and regular rhythm.      Pulses: Normal pulses.      Heart sounds: Normal heart sounds. No murmur heard.    No friction rub. No gallop.   Pulmonary:      Effort: Pulmonary effort is normal. No respiratory distress.      Breath sounds: Normal breath sounds. No stridor. No wheezing, rhonchi or rales.   Chest:      Chest wall: No tenderness.   Musculoskeletal:      Cervical back: Neck supple. No tenderness.   Lymphadenopathy:      " Cervical: No cervical adenopathy.   Skin:     General: Skin is warm and dry.      Capillary Refill: Capillary refill takes less than 2 seconds.   Neurological:      General: No focal deficit present.      Mental Status: She is alert and oriented to person, place, and time. Mental status is at baseline.      Gait: Gait normal.   Psychiatric:         Mood and Affect: Mood normal.         Behavior: Behavior normal.         Thought Content: Thought content normal.         Judgment: Judgment normal.       Assessment/Plan:     Diagnosis and associated orders:   1. Pharyngitis due to Streptococcus species  POCT Rapid Strep A    amoxicillin (AMOXIL) 500 MG Cap      2. Exposure to strep throat  POCT Rapid Strep A    amoxicillin (AMOXIL) 500 MG Cap             Comments/MDM:   Pt is clinically stable at today's acute urgent care visit.  No acute distress noted. Appropriate for outpatient management at this time.     Acute problem.  Rapid strep testing positive in clinic today.  Patient will be treated with amoxicillin x10 days.  Advised patient begin taking antibiotic as prescribed, warm salt water gargles, alternate Tylenol ibuprofen as needed for pain, and replace toothbrush in 48 hours.  She is to return for any new or worsening signs or symptoms or symptoms of fail to improve.  Patient is agreeable plan of care verbalizes good understanding.           Discussed DDx, management options (risks,benefits, and alternatives to planned treatment), natural progression and supportive care.  Expressed understanding and the treatment plan was agreed upon. Questions were encouraged and answered   Return to urgent care prn if new or worsening sx or if there is no improvement in condition prn.    Educated in Red flags and indications to immediately call 911 or present to the Emergency Department.   Advised the patient to follow-up with the primary care physician for recheck, reevaluation, and consideration of further management.    I  personally reviewed prior external notes and test results pertinent to today's visit.  I have independently reviewed and interpreted all diagnostics ordered during this urgent care acute visit.     Please note that this dictation was created using voice recognition software. I have made a reasonable attempt to correct obvious errors, but I expect that there are errors of grammar and possibly content that I did not discover before finalizing the note.    This note was electronically signed by PATTI Spain

## 2023-04-12 ENCOUNTER — TELEPHONE (OUTPATIENT)
Dept: HEALTH INFORMATION MANAGEMENT | Facility: OTHER | Age: 38
End: 2023-04-12

## 2023-05-12 ENCOUNTER — APPOINTMENT (OUTPATIENT)
Dept: SLEEP MEDICINE | Facility: MEDICAL CENTER | Age: 38
End: 2023-05-12
Attending: NURSE PRACTITIONER
Payer: COMMERCIAL

## 2023-11-06 ENCOUNTER — OFFICE VISIT (OUTPATIENT)
Dept: URGENT CARE | Facility: CLINIC | Age: 38
End: 2023-11-06
Payer: COMMERCIAL

## 2023-11-06 ENCOUNTER — HOSPITAL ENCOUNTER (OUTPATIENT)
Facility: MEDICAL CENTER | Age: 38
End: 2023-11-06
Attending: NURSE PRACTITIONER
Payer: COMMERCIAL

## 2023-11-06 VITALS
WEIGHT: 194 LBS | HEART RATE: 86 BPM | RESPIRATION RATE: 16 BRPM | HEIGHT: 65 IN | DIASTOLIC BLOOD PRESSURE: 74 MMHG | BODY MASS INDEX: 32.32 KG/M2 | SYSTOLIC BLOOD PRESSURE: 114 MMHG | OXYGEN SATURATION: 96 % | TEMPERATURE: 99.7 F

## 2023-11-06 DIAGNOSIS — J02.9 SORE THROAT: ICD-10-CM

## 2023-11-06 DIAGNOSIS — J02.9 ACUTE PHARYNGITIS, UNSPECIFIED ETIOLOGY: ICD-10-CM

## 2023-11-06 LAB — S PYO DNA SPEC NAA+PROBE: NOT DETECTED

## 2023-11-06 PROCEDURE — 3074F SYST BP LT 130 MM HG: CPT | Performed by: NURSE PRACTITIONER

## 2023-11-06 PROCEDURE — 87651 STREP A DNA AMP PROBE: CPT | Performed by: NURSE PRACTITIONER

## 2023-11-06 PROCEDURE — 99213 OFFICE O/P EST LOW 20 MIN: CPT | Performed by: NURSE PRACTITIONER

## 2023-11-06 PROCEDURE — 3078F DIAST BP <80 MM HG: CPT | Performed by: NURSE PRACTITIONER

## 2023-11-06 PROCEDURE — 87070 CULTURE OTHR SPECIMN AEROBIC: CPT

## 2023-11-06 RX ORDER — AMOXICILLIN AND CLAVULANATE POTASSIUM 875; 125 MG/1; MG/1
1 TABLET, FILM COATED ORAL 2 TIMES DAILY
Qty: 20 TABLET | Refills: 0 | Status: SHIPPED | OUTPATIENT
Start: 2023-11-06 | End: 2023-11-16

## 2023-11-06 RX ORDER — ESCITALOPRAM OXALATE 20 MG/1
20 TABLET ORAL
COMMUNITY
Start: 2023-08-11

## 2023-11-06 RX ORDER — FENOFIBRATE 134 MG/1
134 CAPSULE ORAL
COMMUNITY
Start: 2023-08-24

## 2023-11-06 ASSESSMENT — ENCOUNTER SYMPTOMS: SORE THROAT: 1

## 2023-11-07 DIAGNOSIS — J02.9 ACUTE PHARYNGITIS, UNSPECIFIED ETIOLOGY: ICD-10-CM

## 2023-11-07 NOTE — PROGRESS NOTES
"Subjective     Joyce Cintron is a 38 y.o. female who presents with Pharyngitis (X10 days, \" Very severe throat pain.\" )            Pharyngitis   This is a new problem. Episode onset: pt reports new onset of ST that started 10 days ago. her left ear is now starting to hurt. no recent fevers. +chills. hx of strep in the past. The pain is worse on the left side. She has tried acetaminophen and NSAIDs for the symptoms. The treatment provided mild relief.       Review of Systems   HENT:  Positive for sore throat.    All other systems reviewed and are negative.         Past Medical History:   Diagnosis Date    Anesthesia     PONV    Back pain     Depression     Disc disorder     Pain 12/15/2015    left hip and buttock      Past Surgical History:   Procedure Laterality Date    HIP ARTHROSCOPY Left 12/17/2015    Procedure: HIP ARTHROSCOPY bursectomy, gluteal eval, osteoplasty, labral debridement, chondroplasty, femoral neck decompression, IT band release;  Surgeon: Obdulio Frankel M.D.;  Location: SURGERY [facility];  Service:     DENTAL EXTRACTION(S)  2003    wisdom teeth      Social History     Socioeconomic History    Marital status:      Spouse name: Not on file    Number of children: Not on file    Years of education: Not on file    Highest education level: Not on file   Occupational History    Not on file   Tobacco Use    Smoking status: Never    Smokeless tobacco: Never   Vaping Use    Vaping Use: Never used   Substance and Sexual Activity    Alcohol use: Not Currently     Alcohol/week: 0.0 oz    Drug use: No    Sexual activity: Yes     Partners: Male   Other Topics Concern    Not on file   Social History Narrative    Not on file     Social Determinants of Health     Financial Resource Strain: Not on file   Food Insecurity: Not on file   Transportation Needs: Not on file   Physical Activity: Not on file   Stress: Not on file   Social Connections: Not on file   Intimate Partner " "Violence: Not on file   Housing Stability: Not on file         Objective     /74 (BP Location: Right arm, Patient Position: Sitting, BP Cuff Size: Adult)   Pulse 86   Temp 37.6 °C (99.7 °F) (Temporal)   Resp 16   Ht 1.651 m (5' 5\")   Wt 88 kg (194 lb)   LMP 10/23/2023 (Approximate)   SpO2 96%   Breastfeeding No   BMI 32.28 kg/m²      Physical Exam  Vitals and nursing note reviewed.   Constitutional:       Appearance: Normal appearance. She is normal weight.   HENT:      Head: Normocephalic and atraumatic.      Right Ear: Tympanic membrane and external ear normal.      Left Ear: Tympanic membrane and external ear normal.      Nose: Nose normal.      Mouth/Throat:      Mouth: Mucous membranes are moist.      Pharynx: Oropharynx is clear. Posterior oropharyngeal erythema present.   Eyes:      Extraocular Movements: Extraocular movements intact.      Pupils: Pupils are equal, round, and reactive to light.   Cardiovascular:      Rate and Rhythm: Normal rate and regular rhythm.   Pulmonary:      Effort: Pulmonary effort is normal.   Musculoskeletal:         General: Normal range of motion.      Cervical back: Normal range of motion.   Skin:     General: Skin is warm and dry.      Capillary Refill: Capillary refill takes less than 2 seconds.   Neurological:      General: No focal deficit present.      Mental Status: She is alert and oriented to person, place, and time. Mental status is at baseline.   Psychiatric:         Mood and Affect: Mood normal.         Speech: Speech normal.         Thought Content: Thought content normal.         Judgment: Judgment normal.                             Assessment & Plan        1. Sore throat  - POCT GROUP A STREP, PCR    2. Acute pharyngitis, unspecified etiology  - CULTURE THROAT; Future  - amoxicillin-clavulanate (AUGMENTIN) 875-125 MG Tab; Take 1 Tablet by mouth 2 times a day for 10 days.  Dispense: 20 Tablet; Refill: 0    Strep negative  Will start on broad spectrum " prasanth  Will contact her via mychart with culture results  Warm salt water gargles  Alternate tylenol and ibuprofen for pain  Soft foods and cool liquids  Throat lozenges as directed  Supportive care, differential diagnoses, and indications for immediate follow-up discussed with patient.    Pathogenesis of diagnosis discussed including typical length and natural progression.    Instructed to return to  or nearest emergency department if symptoms fail to improve, for any change in condition, further concerns, or new concerning symptoms.  Patient states understanding of the plan of care and discharge instructions.

## 2023-11-09 LAB
BACTERIA SPEC RESP CULT: NORMAL
SIGNIFICANT IND 70042: NORMAL
SITE SITE: NORMAL
SOURCE SOURCE: NORMAL

## 2024-04-18 ENCOUNTER — OFFICE VISIT (OUTPATIENT)
Dept: SLEEP MEDICINE | Facility: MEDICAL CENTER | Age: 39
End: 2024-04-18
Attending: NURSE PRACTITIONER
Payer: COMMERCIAL

## 2024-04-18 VITALS
DIASTOLIC BLOOD PRESSURE: 72 MMHG | BODY MASS INDEX: 32.32 KG/M2 | SYSTOLIC BLOOD PRESSURE: 114 MMHG | OXYGEN SATURATION: 94 % | HEIGHT: 65 IN | HEART RATE: 93 BPM | WEIGHT: 194 LBS | RESPIRATION RATE: 12 BRPM

## 2024-04-18 DIAGNOSIS — G47.33 OSA (OBSTRUCTIVE SLEEP APNEA): ICD-10-CM

## 2024-04-18 DIAGNOSIS — Z78.9 NONSMOKER: ICD-10-CM

## 2024-04-18 PROBLEM — E66.9 OBESITY (BMI 35.0-39.9 WITHOUT COMORBIDITY): Status: RESOLVED | Noted: 2017-03-06 | Resolved: 2024-04-18

## 2024-04-18 PROCEDURE — 3074F SYST BP LT 130 MM HG: CPT | Performed by: NURSE PRACTITIONER

## 2024-04-18 PROCEDURE — 99213 OFFICE O/P EST LOW 20 MIN: CPT | Performed by: NURSE PRACTITIONER

## 2024-04-18 PROCEDURE — 99212 OFFICE O/P EST SF 10 MIN: CPT | Performed by: NURSE PRACTITIONER

## 2024-04-18 PROCEDURE — 3078F DIAST BP <80 MM HG: CPT | Performed by: NURSE PRACTITIONER

## 2024-04-18 ASSESSMENT — PATIENT HEALTH QUESTIONNAIRE - PHQ9: CLINICAL INTERPRETATION OF PHQ2 SCORE: 0

## 2024-04-18 NOTE — PROGRESS NOTES
"Chief Complaint   Patient presents with    Follow-Up     Apnea // Last Seen 1/9/2023        HPI:  Joyce Cintron is a 39 y.o. year old female here today for follow-up on BABAK.  Last OV 1/9/23     Currently using APAP @ 5-15cm H20 nightly; RESMED; device obtained 9/20/17.  Compliance report 3/19/2024 through 4/17/2024 indicates 100% compliance, average nightly use 8 hours 54 minutes, mean pressure 8.7 cm, no significant mask leak with reduced AHI of 0.6/h.  Reviewed with patient.    Her machine notes \"blower hours exceeded\" and she never received a new device with her orders from last year.  She contacted [facility] but noted they did not have proper information for authorization.  Will contact them today.  She is unsure if the pressure might be slightly lower she is experiencing morning headaches more recently but no daytime napping or sleepiness.  She does exercise 5 to 6 days a week.  She usually goes to bed at midnight and fall asleep in 15 minutes.  She will generally wake between 3-4 AM and sometimes takes up to an hour to resume sleep until 730-8:30 AM.  Overall she continues to benefit from therapy.  She notes going off of antianxiety medication in the last 6 months and managing things with exercise.  She continues to have some anxiety but feels she is managing it well but is not having weight loss like she was hoping.    Sleep Study History:   PSG study from 8/21/17 indicated moderate obstructive sleep apnea hypopnea with an apnea hypopnea index of 22.5 events per hour and a lowest arterial oxygen saturation of 92% on room air. The heart rate varies from  bpm.      ROS: As per HPI and otherwise negative if not stated.    Past Medical History:   Diagnosis Date    Anesthesia     PONV    Back pain     Depression     Disc disorder     Pain 12/15/2015    left hip and buttock       Past Surgical History:   Procedure Laterality Date    HIP ARTHROSCOPY Left 12/17/2015    Procedure: HIP " "ARTHROSCOPY bursectomy, gluteal eval, osteoplasty, labral debridement, chondroplasty, femoral neck decompression, IT band release;  Surgeon: Obdulio Frankel M.D.;  Location: SURGERY [facility];  Service:     DENTAL EXTRACTION(S)  2003    wisdom teeth       Family History   Problem Relation Age of Onset    Heart Disease Mother     Heart Disease Father     Heart Disease Paternal Grandmother     Heart Disease Paternal Grandfather     Diabetes Other     Hypertension Other        Social History     Socioeconomic History    Marital status:      Spouse name: Not on file    Number of children: Not on file    Years of education: Not on file    Highest education level: Not on file   Occupational History    Not on file   Tobacco Use    Smoking status: Never    Smokeless tobacco: Never   Vaping Use    Vaping Use: Never used   Substance and Sexual Activity    Alcohol use: Not Currently     Alcohol/week: 0.0 oz    Drug use: No    Sexual activity: Yes     Partners: Male   Other Topics Concern    Not on file   Social History Narrative    Not on file     Social Determinants of Health     Financial Resource Strain: Not on file   Food Insecurity: Not on file   Transportation Needs: Not on file   Physical Activity: Not on file   Stress: Not on file   Social Connections: Not on file   Intimate Partner Violence: Not on file   Housing Stability: Not on file       Allergies as of 04/18/2024    (No Known Allergies)        Vitals:  /72 (BP Location: Left arm, Patient Position: Sitting, BP Cuff Size: Adult)   Pulse 93   Resp 12   Ht 1.651 m (5' 5\")   Wt 88 kg (194 lb)   SpO2 94%     Current medications as of today   Current Outpatient Medications   Medication Sig Dispense Refill    fenofibrate micronized (LOFIBRA) 134 MG capsule Take 134 mg by mouth at bedtime.       No current facility-administered medications for this visit.         Physical Exam:   Gen:           Alert and oriented, No apparent distress. Mood " and affect appropriate, normal interaction with examiner.  Eyes:          PERRL, EOM intact, sclere white, conjunctive moist.  Ears:          Not examined.   Hearing:     Grossly intact.  Nose:          Normal, no lesions or deformities.  Dentition:    Not examined.   Oropharynx:   Not examined.   Mallampati Classification: Not examined.   Neck:        Supple, trachea midline, no masses.  Respiratory Effort: No intercostal retractions or use of accessory muscles.   Lung Auscultation:      Clear to auscultation bilaterally; no rales, rhonchi or wheezing.  CV:            Regular rate and rhythm. No murmurs, rubs or gallops.  Abd:           Not examined.   Lymphadenopathy: Not examined.  Gait and Station: Normal.  Digits and Nails: No clubbing, cyanosis, petechiae, or nodes.   Cranial Nerves: II-XII grossly intact.  Skin:        No rashes, lesions or ulcers noted.               Ext:           No cyanosis or edema.      Assessment:  1. BABAK (obstructive sleep apnea)        2. BMI 32.0-32.9,adult  HEIGHT AND WEIGHT      3. Nonsmoker            Immunizations:    Flu:recommend in the fall  Pneumovax 23:not due  Prevnar 13:not due  PCV 20: not due  COVID-19: 2021    Plan:  BABAK is well treated on therapy.  She will continue to benefit from auto CPAP.  Recommend updating her device since she is starting to have regular morning headaches which could be related from a slight pressure reduction due to machine malfunction or increased stress.  We will call [facility] to find out what information they need and if data testing is necessary.   DME mask/supplies   DME CPAP; >5yrs old replace  Encouraged weight loss or healthy diet and activity.  Encouraged her to continue with regular exercise, hydration healthy diet.  Follow primary care for the health concerns.  Follow-up in 3 months for compliance check on new device, sooner if needed.    Please note that this dictation was created using voice recognition software. I have made every  reasonable attempt to correct obvious errors, but it is possible there are errors of grammar and possibly content that I did not discover before finalizing the note.

## 2024-06-08 SDOH — ECONOMIC STABILITY: FOOD INSECURITY: WITHIN THE PAST 12 MONTHS, THE FOOD YOU BOUGHT JUST DIDN'T LAST AND YOU DIDN'T HAVE MONEY TO GET MORE.: NEVER TRUE

## 2024-06-08 SDOH — ECONOMIC STABILITY: HOUSING INSECURITY: IN THE LAST 12 MONTHS, HOW MANY PLACES HAVE YOU LIVED?: 1

## 2024-06-08 SDOH — ECONOMIC STABILITY: TRANSPORTATION INSECURITY
IN THE PAST 12 MONTHS, HAS LACK OF RELIABLE TRANSPORTATION KEPT YOU FROM MEDICAL APPOINTMENTS, MEETINGS, WORK OR FROM GETTING THINGS NEEDED FOR DAILY LIVING?: NO

## 2024-06-08 SDOH — HEALTH STABILITY: PHYSICAL HEALTH: ON AVERAGE, HOW MANY DAYS PER WEEK DO YOU ENGAGE IN MODERATE TO STRENUOUS EXERCISE (LIKE A BRISK WALK)?: 6 DAYS

## 2024-06-08 SDOH — ECONOMIC STABILITY: HOUSING INSECURITY
IN THE LAST 12 MONTHS, WAS THERE A TIME WHEN YOU DID NOT HAVE A STEADY PLACE TO SLEEP OR SLEPT IN A SHELTER (INCLUDING NOW)?: NO

## 2024-06-08 SDOH — ECONOMIC STABILITY: INCOME INSECURITY: HOW HARD IS IT FOR YOU TO PAY FOR THE VERY BASICS LIKE FOOD, HOUSING, MEDICAL CARE, AND HEATING?: NOT VERY HARD

## 2024-06-08 SDOH — HEALTH STABILITY: PHYSICAL HEALTH: ON AVERAGE, HOW MANY MINUTES DO YOU ENGAGE IN EXERCISE AT THIS LEVEL?: 60 MIN

## 2024-06-08 SDOH — ECONOMIC STABILITY: FOOD INSECURITY: WITHIN THE PAST 12 MONTHS, YOU WORRIED THAT YOUR FOOD WOULD RUN OUT BEFORE YOU GOT MONEY TO BUY MORE.: NEVER TRUE

## 2024-06-08 SDOH — ECONOMIC STABILITY: TRANSPORTATION INSECURITY
IN THE PAST 12 MONTHS, HAS THE LACK OF TRANSPORTATION KEPT YOU FROM MEDICAL APPOINTMENTS OR FROM GETTING MEDICATIONS?: NO

## 2024-06-08 SDOH — HEALTH STABILITY: MENTAL HEALTH
STRESS IS WHEN SOMEONE FEELS TENSE, NERVOUS, ANXIOUS, OR CAN'T SLEEP AT NIGHT BECAUSE THEIR MIND IS TROUBLED. HOW STRESSED ARE YOU?: TO SOME EXTENT

## 2024-06-08 SDOH — ECONOMIC STABILITY: INCOME INSECURITY: IN THE LAST 12 MONTHS, WAS THERE A TIME WHEN YOU WERE NOT ABLE TO PAY THE MORTGAGE OR RENT ON TIME?: NO

## 2024-06-08 SDOH — ECONOMIC STABILITY: TRANSPORTATION INSECURITY
IN THE PAST 12 MONTHS, HAS LACK OF TRANSPORTATION KEPT YOU FROM MEETINGS, WORK, OR FROM GETTING THINGS NEEDED FOR DAILY LIVING?: NO

## 2024-06-08 ASSESSMENT — LIFESTYLE VARIABLES
HOW OFTEN DO YOU HAVE A DRINK CONTAINING ALCOHOL: NEVER
AUDIT-C TOTAL SCORE: 0
HOW OFTEN DO YOU HAVE SIX OR MORE DRINKS ON ONE OCCASION: NEVER
HOW MANY STANDARD DRINKS CONTAINING ALCOHOL DO YOU HAVE ON A TYPICAL DAY: PATIENT DOES NOT DRINK
SKIP TO QUESTIONS 9-10: 1

## 2024-06-08 ASSESSMENT — SOCIAL DETERMINANTS OF HEALTH (SDOH)
DO YOU BELONG TO ANY CLUBS OR ORGANIZATIONS SUCH AS CHURCH GROUPS UNIONS, FRATERNAL OR ATHLETIC GROUPS, OR SCHOOL GROUPS?: YES
HOW OFTEN DO YOU ATTEND CHURCH OR RELIGIOUS SERVICES?: MORE THAN 4 TIMES PER YEAR
HOW OFTEN DO YOU ATTENT MEETINGS OF THE CLUB OR ORGANIZATION YOU BELONG TO?: MORE THAN 4 TIMES PER YEAR
HOW OFTEN DO YOU GET TOGETHER WITH FRIENDS OR RELATIVES?: THREE TIMES A WEEK
HOW HARD IS IT FOR YOU TO PAY FOR THE VERY BASICS LIKE FOOD, HOUSING, MEDICAL CARE, AND HEATING?: NOT VERY HARD
HOW OFTEN DO YOU HAVE SIX OR MORE DRINKS ON ONE OCCASION: NEVER
DO YOU BELONG TO ANY CLUBS OR ORGANIZATIONS SUCH AS CHURCH GROUPS UNIONS, FRATERNAL OR ATHLETIC GROUPS, OR SCHOOL GROUPS?: YES
HOW OFTEN DO YOU HAVE A DRINK CONTAINING ALCOHOL: NEVER
HOW MANY DRINKS CONTAINING ALCOHOL DO YOU HAVE ON A TYPICAL DAY WHEN YOU ARE DRINKING: PATIENT DOES NOT DRINK
HOW OFTEN DO YOU ATTENT MEETINGS OF THE CLUB OR ORGANIZATION YOU BELONG TO?: MORE THAN 4 TIMES PER YEAR
IN A TYPICAL WEEK, HOW MANY TIMES DO YOU TALK ON THE PHONE WITH FAMILY, FRIENDS, OR NEIGHBORS?: MORE THAN THREE TIMES A WEEK
IN A TYPICAL WEEK, HOW MANY TIMES DO YOU TALK ON THE PHONE WITH FAMILY, FRIENDS, OR NEIGHBORS?: MORE THAN THREE TIMES A WEEK
HOW OFTEN DO YOU GET TOGETHER WITH FRIENDS OR RELATIVES?: THREE TIMES A WEEK
WITHIN THE PAST 12 MONTHS, YOU WORRIED THAT YOUR FOOD WOULD RUN OUT BEFORE YOU GOT THE MONEY TO BUY MORE: NEVER TRUE
HOW OFTEN DO YOU ATTEND CHURCH OR RELIGIOUS SERVICES?: MORE THAN 4 TIMES PER YEAR

## 2024-06-11 ENCOUNTER — APPOINTMENT (OUTPATIENT)
Dept: MEDICAL GROUP | Facility: MEDICAL CENTER | Age: 39
End: 2024-06-11
Payer: COMMERCIAL

## 2024-06-11 VITALS
OXYGEN SATURATION: 97 % | HEIGHT: 65 IN | HEART RATE: 82 BPM | TEMPERATURE: 97.3 F | WEIGHT: 202 LBS | DIASTOLIC BLOOD PRESSURE: 74 MMHG | SYSTOLIC BLOOD PRESSURE: 116 MMHG | BODY MASS INDEX: 33.66 KG/M2

## 2024-06-11 DIAGNOSIS — Z00.00 ENCOUNTER FOR MEDICAL EXAMINATION TO ESTABLISH CARE: ICD-10-CM

## 2024-06-11 DIAGNOSIS — Z11.59 NEED FOR HEPATITIS C SCREENING TEST: ICD-10-CM

## 2024-06-11 DIAGNOSIS — R41.840 INATTENTION: ICD-10-CM

## 2024-06-11 DIAGNOSIS — E78.5 DYSLIPIDEMIA: ICD-10-CM

## 2024-06-11 DIAGNOSIS — Z11.4 ENCOUNTER FOR SCREENING FOR HIV: ICD-10-CM

## 2024-06-11 DIAGNOSIS — Z13.1 SCREENING FOR DIABETES MELLITUS: ICD-10-CM

## 2024-06-11 DIAGNOSIS — Z86.2 HISTORY OF ANEMIA: ICD-10-CM

## 2024-06-11 DIAGNOSIS — E55.9 VITAMIN D DEFICIENCY: ICD-10-CM

## 2024-06-11 DIAGNOSIS — Z82.49 FAMILY HISTORY OF MI (MYOCARDIAL INFARCTION): ICD-10-CM

## 2024-06-11 DIAGNOSIS — R63.5 WEIGHT GAIN: ICD-10-CM

## 2024-06-11 PROBLEM — Z23 NEED FOR INFLUENZA VACCINATION: Status: RESOLVED | Noted: 2020-11-23 | Resolved: 2024-06-11

## 2024-06-11 PROBLEM — J02.9 SORE THROAT: Status: RESOLVED | Noted: 2017-03-06 | Resolved: 2024-06-11

## 2024-06-11 PROCEDURE — 3074F SYST BP LT 130 MM HG: CPT | Performed by: STUDENT IN AN ORGANIZED HEALTH CARE EDUCATION/TRAINING PROGRAM

## 2024-06-11 PROCEDURE — 3078F DIAST BP <80 MM HG: CPT | Performed by: STUDENT IN AN ORGANIZED HEALTH CARE EDUCATION/TRAINING PROGRAM

## 2024-06-11 PROCEDURE — 99214 OFFICE O/P EST MOD 30 MIN: CPT | Performed by: STUDENT IN AN ORGANIZED HEALTH CARE EDUCATION/TRAINING PROGRAM

## 2024-06-11 NOTE — LETTER
[facility]  Edith Caceres M.D.  [address]  [facility] [address]  Fax: [phone]   Authorization for Release/Disclosure of   Protected Health Information   Name: JOYCE ORTIZ : 1985 SSN: [id number]   Address: [address] [place] [id number] Phone:    [phone] (home)    I authorize the entity listed below to release/disclose the PHI below to:   [facility]/Edith Caceres M.D. and Edith Caceres M.D.   Provider or Entity Name:  Dalia Baker with [facility]   Address   City, State, [facility]   Phone:      Fax:     Reason for request: continuity of care   Information to be released:    [  ] LAST COLONOSCOPY,  including any PATH REPORT and follow-up  [  ] LAST FIT/COLOGUARD RESULT [  ] LAST DEXA  [  ] LAST MAMMOGRAM  [XX ] LAST PAP  [  ] LAST LABS [  ] RETINA EXAM REPORT  [  ] IMMUNIZATION RECORDS  [  ] Release all info      [  ] Check here and initial the line next to each item to release ALL health information INCLUDING  _____ Care and treatment for drug and / or alcohol abuse  _____ HIV testing, infection status, or AIDS  _____ Genetic Testing    DATES OF SERVICE OR TIME PERIOD TO BE DISCLOSED: _____________  I understand and acknowledge that:  * This Authorization may be revoked at any time by you in writing, except if your health information has already been used or disclosed.  * Your health information that will be used or disclosed as a result of you signing this authorization could be re-disclosed by the recipient. If this occurs, your re-disclosed health information may no longer be protected by State or Federal laws.  * You may refuse to sign this Authorization. Your refusal will not affect your ability to obtain treatment.  * This Authorization becomes effective upon signing and will  on (date) __________.      If no date is indicated, this Authorization will  one (1) year from the signature date.    Name: Joyce  Agustín Cintron  Signature: Date:   6/11/2024     PLEASE FAX REQUESTED RECORDS BACK TO: [phone]

## 2024-06-11 NOTE — PROGRESS NOTES
"Subjective:     CC:  Diagnoses of Encounter for medical examination to establish care, Vitamin D deficiency, Dyslipidemia, Weight gain, Inattention, Family history of MI (myocardial infarction), Screening for diabetes mellitus, Need for hepatitis C screening test, Encounter for screening for HIV, and History of anemia were pertinent to this visit.    HISTORY OF THE PRESENT ILLNESS: Patient is a 39 y.o. female. This pleasant patient is here today to establish care and discuss the following    Problem   Weight Gain    This is a chronic condition.  Over the last 18 months she has gained about 30 pounds.  She is intentionally eating in a calorie deficit as well as exercising regularly.     Inattention    This is a chronic condition, never evaluated.  She states that for most of her life she has had issues with focus, feeling overwhelmed and inattention.  Her family members all are concerned that she has ADHD and she is interested in getting testing.     Dyslipidemia    Chronic condition, currently on fenofibrate due to primarily elevated triglycerides     Family History of MI (Myocardial Infarction)    History of early MIs in the family.  Father had MI in his 50s and paternal grandfather had an MI in his 50s     Vitamin D Deficiency    History of vitamin D deficiency.  No recent labs     Need for Influenza Vaccination (Resolved)   Sore Throat (Resolved)   Insomnia (Resolved)   Hair Loss (Resolved)   Stress and Adjustment Reaction (Resolved)   Hip Pain (Resolved)     ROS:   ROS      Objective:     Exam: /74   Pulse 82   Temp 36.3 °C (97.3 °F)   Ht 1.651 m (5' 5\")   Wt 91.6 kg (202 lb)   SpO2 97%  Body mass index is 33.61 kg/m².    Physical Exam  Vitals reviewed.   Constitutional:       General: She is not in acute distress.     Appearance: She is not toxic-appearing.   HENT:      Head: Normocephalic and atraumatic.      Right Ear: External ear normal.      Left Ear: External ear normal.   Eyes:      General:   "       Right eye: No discharge.         Left eye: No discharge.      Extraocular Movements: Extraocular movements intact.      Conjunctiva/sclera: Conjunctivae normal.   Cardiovascular:      Rate and Rhythm: Normal rate and regular rhythm.      Heart sounds: Normal heart sounds. No murmur heard.  Pulmonary:      Effort: Pulmonary effort is normal. No respiratory distress.      Breath sounds: Normal breath sounds. No wheezing or rales.   Skin:     General: Skin is warm and dry.   Neurological:      Mental Status: She is alert.   Psychiatric:         Mood and Affect: Mood normal.         Behavior: Behavior normal.         Thought Content: Thought content normal.         Judgment: Judgment normal.           Assessment & Plan:   39 y.o. female with the following -    1. Encounter for medical examination to establish care  History, problem list, medications and allergies reviewed.  Records requested from previous provider if applicable.    2. Vitamin D deficiency  Recheck labs  - VITAMIN D,25 HYDROXY (DEFICIENCY); Future    3. Dyslipidemia  Continue fenofibrate at current dosing, recheck labs.  We did discuss that we may switch to a statin based on labs and for MI prevention  - Lipid Profile; Future  - Comp Metabolic Panel; Future    4. Weight gain  Check thyroid function since she is doing the appropriate measures to lose weight and has actually been gaining weight  - TSH+FREE T4    5. Inattention  Referral to behavioral health for ADHD testing  - Referral to Behavioral Health    6. Family history of MI (myocardial infarction)  Rechecking cholesterol, based on results may discuss starting statin    7. Screening for diabetes mellitus  - HEMOGLOBIN A1C; Future    8. Need for hepatitis C screening test  - HEP C VIRUS ANTIBODY; Future    9. Encounter for screening for HIV  - HIV AG/AB COMBO ASSAY SCREENING; Future    10. History of anemia  - CBC WITH DIFFERENTIAL; Future      No follow-ups on file.    Please note that this  dictation was created using voice recognition software. I have made every reasonable attempt to correct obvious errors, but I expect that there are errors of grammar and possibly content that I did not discover before finalizing the note.

## 2024-06-14 ENCOUNTER — NON-PROVIDER VISIT (OUTPATIENT)
Dept: OCCUPATIONAL MEDICINE | Facility: CLINIC | Age: 39
End: 2024-06-14

## 2024-06-14 DIAGNOSIS — Z02.1 PRE-EMPLOYMENT DRUG SCREENING: ICD-10-CM

## 2024-06-14 LAB
AMP AMPHETAMINE: NEGATIVE
BAR BARBITURATES: NEGATIVE
BZO BENZODIAZEPINES: NEGATIVE
COC COCAINE: NEGATIVE
INT CON NEG: NEGATIVE
INT CON POS: POSITIVE
MDMA ECSTASY: NEGATIVE
MET METHAMPHETAMINES: NEGATIVE
MTD METHADONE: NEGATIVE
OPI OPIATES: NEGATIVE
OXY OXYCODONE: NEGATIVE
PCP PHENCYCLIDINE: NEGATIVE
POC URINE DRUG SCREEN OCDRS: NORMAL
THC: NEGATIVE

## 2024-06-14 PROCEDURE — 86580 TB INTRADERMAL TEST: CPT | Performed by: NURSE PRACTITIONER

## 2024-06-14 PROCEDURE — 80305 DRUG TEST PRSMV DIR OPT OBS: CPT | Performed by: PREVENTIVE MEDICINE

## 2024-06-14 NOTE — PROGRESS NOTES
Joyce Cintron is a 39 y.o. female here for a non-provider visit for PPD placement -- Step 1 of 2    Reason for PPD:  work requirement    1. TB evaluation questionnaire completed by patient? Yes      -  If any answers marked yes did you contact a provider prior to placing? Not Indicated  2.  Patient notified to return to clinic for reading on: 6/16 after 2:50 pm or 6/17 before 2:50 pm  3.  PPD Placement documentation completed on TB evaluation questionnaire? YES  4.  Location of TB evaluation questionnaire filed: [facility]

## 2024-06-17 ENCOUNTER — NON-PROVIDER VISIT (OUTPATIENT)
Dept: OCCUPATIONAL MEDICINE | Facility: CLINIC | Age: 39
End: 2024-06-17

## 2024-06-17 DIAGNOSIS — Z11.1 ENCOUNTER FOR PPD SKIN TEST READING: ICD-10-CM

## 2024-06-17 LAB — TB WHEAL 3D P 5 TU DIAM: NORMAL MM

## 2024-06-18 ENCOUNTER — HOSPITAL ENCOUNTER (OUTPATIENT)
Dept: LAB | Facility: MEDICAL CENTER | Age: 39
End: 2024-06-18
Attending: STUDENT IN AN ORGANIZED HEALTH CARE EDUCATION/TRAINING PROGRAM
Payer: COMMERCIAL

## 2024-06-18 DIAGNOSIS — E55.9 VITAMIN D DEFICIENCY: ICD-10-CM

## 2024-06-18 DIAGNOSIS — E78.5 DYSLIPIDEMIA: ICD-10-CM

## 2024-06-18 DIAGNOSIS — Z11.59 NEED FOR HEPATITIS C SCREENING TEST: ICD-10-CM

## 2024-06-18 DIAGNOSIS — Z86.2 HISTORY OF ANEMIA: ICD-10-CM

## 2024-06-18 DIAGNOSIS — Z13.1 SCREENING FOR DIABETES MELLITUS: ICD-10-CM

## 2024-06-18 DIAGNOSIS — Z11.4 ENCOUNTER FOR SCREENING FOR HIV: ICD-10-CM

## 2024-06-18 LAB
25(OH)D3 SERPL-MCNC: 29 NG/ML (ref 30–100)
ALBUMIN SERPL BCP-MCNC: 4.3 G/DL (ref 3.2–4.9)
ALBUMIN/GLOB SERPL: 2 G/DL
ALP SERPL-CCNC: 42 U/L (ref 30–99)
ALT SERPL-CCNC: 11 U/L (ref 2–50)
ANION GAP SERPL CALC-SCNC: 12 MMOL/L (ref 7–16)
AST SERPL-CCNC: 17 U/L (ref 12–45)
BASOPHILS # BLD AUTO: 0.4 % (ref 0–1.8)
BASOPHILS # BLD: 0.03 K/UL (ref 0–0.12)
BILIRUB SERPL-MCNC: 0.2 MG/DL (ref 0.1–1.5)
BUN SERPL-MCNC: 19 MG/DL (ref 8–22)
CALCIUM ALBUM COR SERPL-MCNC: 8.8 MG/DL (ref 8.5–10.5)
CALCIUM SERPL-MCNC: 9 MG/DL (ref 8.5–10.5)
CHLORIDE SERPL-SCNC: 105 MMOL/L (ref 96–112)
CHOLEST SERPL-MCNC: 150 MG/DL (ref 100–199)
CO2 SERPL-SCNC: 21 MMOL/L (ref 20–33)
CREAT SERPL-MCNC: 1.01 MG/DL (ref 0.5–1.4)
EOSINOPHIL # BLD AUTO: 0.21 K/UL (ref 0–0.51)
EOSINOPHIL NFR BLD: 2.9 % (ref 0–6.9)
ERYTHROCYTE [DISTWIDTH] IN BLOOD BY AUTOMATED COUNT: 41.5 FL (ref 35.9–50)
EST. AVERAGE GLUCOSE BLD GHB EST-MCNC: 103 MG/DL
FASTING STATUS PATIENT QL REPORTED: NORMAL
GFR SERPLBLD CREATININE-BSD FMLA CKD-EPI: 72 ML/MIN/1.73 M 2
GLOBULIN SER CALC-MCNC: 2.1 G/DL (ref 1.9–3.5)
GLUCOSE SERPL-MCNC: 90 MG/DL (ref 65–99)
HBA1C MFR BLD: 5.2 % (ref 4–5.6)
HCT VFR BLD AUTO: 35.6 % (ref 37–47)
HCV AB SER QL: NORMAL
HDLC SERPL-MCNC: 44 MG/DL
HGB BLD-MCNC: 11.4 G/DL (ref 12–16)
HIV 1+2 AB+HIV1 P24 AG SERPL QL IA: NORMAL
IMM GRANULOCYTES # BLD AUTO: 0.02 K/UL (ref 0–0.11)
IMM GRANULOCYTES NFR BLD AUTO: 0.3 % (ref 0–0.9)
LDLC SERPL CALC-MCNC: 84 MG/DL
LYMPHOCYTES # BLD AUTO: 2.89 K/UL (ref 1–4.8)
LYMPHOCYTES NFR BLD: 39.3 % (ref 22–41)
MCH RBC QN AUTO: 27.7 PG (ref 27–33)
MCHC RBC AUTO-ENTMCNC: 32 G/DL (ref 32.2–35.5)
MCV RBC AUTO: 86.6 FL (ref 81.4–97.8)
MONOCYTES # BLD AUTO: 0.49 K/UL (ref 0–0.85)
MONOCYTES NFR BLD AUTO: 6.7 % (ref 0–13.4)
NEUTROPHILS # BLD AUTO: 3.72 K/UL (ref 1.82–7.42)
NEUTROPHILS NFR BLD: 50.4 % (ref 44–72)
NRBC # BLD AUTO: 0 K/UL
NRBC BLD-RTO: 0 /100 WBC (ref 0–0.2)
PLATELET # BLD AUTO: 334 K/UL (ref 164–446)
PMV BLD AUTO: 12 FL (ref 9–12.9)
POTASSIUM SERPL-SCNC: 4 MMOL/L (ref 3.6–5.5)
PROT SERPL-MCNC: 6.4 G/DL (ref 6–8.2)
RBC # BLD AUTO: 4.11 M/UL (ref 4.2–5.4)
SODIUM SERPL-SCNC: 138 MMOL/L (ref 135–145)
T4 FREE SERPL-MCNC: 1.33 NG/DL (ref 0.93–1.7)
TRIGL SERPL-MCNC: 109 MG/DL (ref 0–149)
TSH SERPL DL<=0.005 MIU/L-ACNC: 1.84 UIU/ML (ref 0.38–5.33)
WBC # BLD AUTO: 7.4 K/UL (ref 4.8–10.8)

## 2024-06-18 PROCEDURE — 80053 COMPREHEN METABOLIC PANEL: CPT

## 2024-06-18 PROCEDURE — 86803 HEPATITIS C AB TEST: CPT

## 2024-06-18 PROCEDURE — 82306 VITAMIN D 25 HYDROXY: CPT

## 2024-06-18 PROCEDURE — 84439 ASSAY OF FREE THYROXINE: CPT

## 2024-06-18 PROCEDURE — 87389 HIV-1 AG W/HIV-1&-2 AB AG IA: CPT

## 2024-06-18 PROCEDURE — 85025 COMPLETE CBC W/AUTO DIFF WBC: CPT

## 2024-06-18 PROCEDURE — 36415 COLL VENOUS BLD VENIPUNCTURE: CPT

## 2024-06-18 PROCEDURE — 84443 ASSAY THYROID STIM HORMONE: CPT

## 2024-06-18 PROCEDURE — 83036 HEMOGLOBIN GLYCOSYLATED A1C: CPT

## 2024-06-18 PROCEDURE — 80061 LIPID PANEL: CPT

## 2024-06-21 ENCOUNTER — NON-PROVIDER VISIT (OUTPATIENT)
Dept: OCCUPATIONAL MEDICINE | Facility: CLINIC | Age: 39
End: 2024-06-21

## 2024-06-21 DIAGNOSIS — Z11.1 ENCOUNTER FOR PPD TEST: Primary | ICD-10-CM

## 2024-06-21 PROCEDURE — 86580 TB INTRADERMAL TEST: CPT

## 2024-06-24 ENCOUNTER — NON-PROVIDER VISIT (OUTPATIENT)
Dept: OCCUPATIONAL MEDICINE | Facility: CLINIC | Age: 39
End: 2024-06-24

## 2024-06-24 LAB — TB WHEAL 3D P 5 TU DIAM: NORMAL MM

## 2024-07-03 ENCOUNTER — TELEPHONE (OUTPATIENT)
Dept: SLEEP MEDICINE | Facility: MEDICAL CENTER | Age: 39
End: 2024-07-03
Payer: COMMERCIAL

## 2024-07-03 ENCOUNTER — PATIENT MESSAGE (OUTPATIENT)
Dept: SLEEP MEDICINE | Facility: MEDICAL CENTER | Age: 39
End: 2024-07-03

## 2024-07-23 ENCOUNTER — APPOINTMENT (OUTPATIENT)
Dept: SLEEP MEDICINE | Facility: MEDICAL CENTER | Age: 39
End: 2024-07-23
Attending: NURSE PRACTITIONER
Payer: COMMERCIAL

## 2024-09-03 NOTE — PATIENT COMMUNICATION
Pt LVM a VM stating that she has been having issues with [employer] and receiving her new cpap machine throw them. Pt asked the the order for the new cpap machine can be faxed to [facility] with all the needed information. Called the pt back to inform her that I just faxed the order for the new cpap machine to [facility] with all the needed information. I asked the pt if she would like the order for mask and supplies sent to them as well pt stated yes. Both order were fax to [facility] with all the needed information. I did advise the pt to schedule an OV once she receive her machine for insurance purpose so the can see the pt is using and benefiting. Pt understood and will call back to schedule apt.

## 2024-10-16 ENCOUNTER — OFFICE VISIT (OUTPATIENT)
Dept: MEDICAL GROUP | Facility: MEDICAL CENTER | Age: 39
End: 2024-10-16
Payer: COMMERCIAL

## 2024-10-16 VITALS
WEIGHT: 178.57 LBS | SYSTOLIC BLOOD PRESSURE: 124 MMHG | TEMPERATURE: 98.4 F | OXYGEN SATURATION: 98 % | HEART RATE: 100 BPM | BODY MASS INDEX: 29.75 KG/M2 | DIASTOLIC BLOOD PRESSURE: 72 MMHG | HEIGHT: 65 IN

## 2024-10-16 DIAGNOSIS — R41.840 INATTENTION: ICD-10-CM

## 2024-10-16 DIAGNOSIS — E78.5 DYSLIPIDEMIA: ICD-10-CM

## 2024-10-16 DIAGNOSIS — F90.8 OTHER SPECIFIED ATTENTION DEFICIT HYPERACTIVITY DISORDER (ADHD): ICD-10-CM

## 2024-10-16 DIAGNOSIS — G47.33 OSA (OBSTRUCTIVE SLEEP APNEA): Chronic | ICD-10-CM

## 2024-10-16 PROCEDURE — 99214 OFFICE O/P EST MOD 30 MIN: CPT | Performed by: NURSE PRACTITIONER

## 2024-10-16 PROCEDURE — 3078F DIAST BP <80 MM HG: CPT | Performed by: NURSE PRACTITIONER

## 2024-10-16 PROCEDURE — 3074F SYST BP LT 130 MM HG: CPT | Performed by: NURSE PRACTITIONER

## 2024-10-16 RX ORDER — FENOFIBRATE 134 MG/1
134 CAPSULE ORAL
Qty: 90 CAPSULE | Refills: 0 | Status: SHIPPED | OUTPATIENT
Start: 2024-10-16

## 2024-10-16 ASSESSMENT — ENCOUNTER SYMPTOMS
PALPITATIONS: 0
FEVER: 0
CHILLS: 0

## 2024-10-16 ASSESSMENT — FIBROSIS 4 INDEX: FIB4 SCORE: 0.61

## 2024-12-02 ENCOUNTER — TELEPHONE (OUTPATIENT)
Dept: SCHEDULING | Facility: IMAGING CENTER | Age: 39
End: 2024-12-02
Payer: COMMERCIAL

## 2024-12-02 NOTE — TELEPHONE ENCOUNTER
Caller: Joyce Cintron     Topic/issue: Pt has upcoming appt and her telehealth psychologist is requesting she request an EKG, can that be done in office? If not could [facility] place order for one? Please advise     Callback Number: [phone]    Thank You   Nel RIVAS

## 2024-12-04 ENCOUNTER — OFFICE VISIT (OUTPATIENT)
Dept: SLEEP MEDICINE | Facility: MEDICAL CENTER | Age: 39
End: 2024-12-04
Attending: PHYSICIAN ASSISTANT
Payer: COMMERCIAL

## 2024-12-04 VITALS
DIASTOLIC BLOOD PRESSURE: 70 MMHG | SYSTOLIC BLOOD PRESSURE: 102 MMHG | OXYGEN SATURATION: 97 % | HEIGHT: 65 IN | BODY MASS INDEX: 27.82 KG/M2 | WEIGHT: 167 LBS | HEART RATE: 107 BPM

## 2024-12-04 DIAGNOSIS — G47.33 OSA (OBSTRUCTIVE SLEEP APNEA): ICD-10-CM

## 2024-12-04 PROCEDURE — 99211 OFF/OP EST MAY X REQ PHY/QHP: CPT | Performed by: PHYSICIAN ASSISTANT

## 2024-12-04 PROCEDURE — 3078F DIAST BP <80 MM HG: CPT | Performed by: PHYSICIAN ASSISTANT

## 2024-12-04 PROCEDURE — 99213 OFFICE O/P EST LOW 20 MIN: CPT | Performed by: PHYSICIAN ASSISTANT

## 2024-12-04 PROCEDURE — 3074F SYST BP LT 130 MM HG: CPT | Performed by: PHYSICIAN ASSISTANT

## 2024-12-04 ASSESSMENT — ENCOUNTER SYMPTOMS
INSOMNIA: 0
SORE THROAT: 0
SPUTUM PRODUCTION: 0
TREMORS: 0
WEIGHT LOSS: 0
COUGH: 0
ORTHOPNEA: 0
SHORTNESS OF BREATH: 0
HEARTBURN: 0
PALPITATIONS: 0
CHILLS: 0
SINUS PAIN: 0
FEVER: 0
ROS GI COMMENTS: NO DENTURES, NO MISSING TEETH OR SWALLOWING ISSUES
HEADACHES: 0
WHEEZING: 0
DIZZINESS: 0

## 2024-12-04 ASSESSMENT — FIBROSIS 4 INDEX: FIB4 SCORE: 0.61

## 2024-12-04 NOTE — PROGRESS NOTES
"Chief Complaint   Patient presents with    Follow-Up     Last seen 4/18/24 w/ Bernice Gonzalez for babak        HPI:  Joyce Cintron is a 39 y.o. year old female here today for follow-up on obstructive sleep apnea and first compliance of new device.  Last seen in clinic 4/18/2024 by PATTI Ann.  Previously evaluated by Dr. Walter Berry 11/24/2020.    Past Medical History: BABAK, vitamin D deficiency, dyslipidemia, ADHD, disc disorder.    Vitals:  /70 (BP Location: Left arm, Patient Position: Sitting, BP Cuff Size: Adult)   Pulse (!) 107   Ht 1.651 m (5' 5\")   Wt 75.8 kg (167 lb)   SpO2 97% BMI of 27.79 kg/m².    Recent Imaging: None    Echocardiogram obtained through [facility] in 2020 for palpitations during pregnancy demonstrated normal left ventricular chamber size, wall thickness, systolic and diastolic function.  Normal right ventricular size and function.  Trace to mild mitral regurgitation noted, trace to mild tricuspid regurgitation.    Currently using  Resmed auto CPAP @5-15 cm H20 pressure; compliance reviewed for 11/4/2024 through 12/3/2024, days used 30/30, average daily usage 9 hours 6 minutes, 100% of days greater than or equal to 4 hours, mask leak at 1.2 LPM at 95th percentile, AHI 0.4 per hour.  See media for full report    Device obtained September 5, 2024  DME provider [facility]  Mask interface nasal cushion    Polysomnogram obtained 8/31/2017 with findings consistent with moderate obstructive sleep apnea with overall AHI of 22.5 events per hour and low O2 sat of 92%.  Sleep fragmentation was noted.  Recommendation was made for auto CPAP trial versus titration study.    Sleep schedule goes to bed around midnight and wakens 7:30 AM   Symptoms denies day time somnolence and denies morning headache    [place] Sleepiness Scale No data recorded   Stop Bang Score No data recorded         Review of Systems   Constitutional:  Negative for chills, fever, malaise/fatigue and " weight loss.   HENT:  Negative for congestion, hearing loss, nosebleeds, sinus pain, sore throat and tinnitus.    Eyes:         Presc glasses    Respiratory:  Negative for cough, sputum production, shortness of breath and wheezing.    Cardiovascular:  Negative for chest pain, palpitations, orthopnea and leg swelling.   Gastrointestinal:  Negative for heartburn.        No dentures, no missing teeth or swallowing issues    Neurological:  Negative for dizziness, tremors and headaches.   Psychiatric/Behavioral:  The patient does not have insomnia.        Past Medical History:   Diagnosis Date    Anemia     Anesthesia     PONV    Back pain     Depression     Disc disorder     Hyperlipidemia     Pain 12/15/2015    left hip and buttock       Past Surgical History:   Procedure Laterality Date    HIP ARTHROSCOPY Left 2015    Procedure: HIP ARTHROSCOPY bursectomy, gluteal eval, osteoplasty, labral debridement, chondroplasty, femoral neck decompression, IT band release;  Surgeon: Obdulio Frankel M.D.;  Location: SURGERY [facility];  Service:     DENTAL EXTRACTION(S)  2003    wisdom teeth    EYE SURGERY         Family History   Problem Relation Age of Onset    Heart Disease Mother     Diabetes Mother         Type 2    Heart Disease Father          at 50 of heart attack    Hypertension Father     Hyperlipidemia Father     Heart Disease Paternal Grandmother     Heart Disease Paternal Grandfather     Diabetes Other     Hypertension Other     Cancer Neg Hx        Social History     Socioeconomic History    Marital status:      Spouse name: Not on file    Number of children: Not on file    Years of education: Not on file    Highest education level: Bachelor's degree (e.g., BA, AB, BS)   Occupational History    Not on file   Tobacco Use    Smoking status: Never    Smokeless tobacco: Never   Vaping Use    Vaping status: Never Used   Substance and Sexual Activity    Alcohol use: Never    Drug use: No     Sexual activity: Yes     Partners: Male     Birth control/protection: Male Sterilization   Other Topics Concern    Not on file   Social History Narrative    Not on file     Social Drivers of Health     Financial Resource Strain: Low Risk  (6/8/2024)    Overall Financial Resource Strain (CARDIA)     Difficulty of Paying Living Expenses: Not very hard   Food Insecurity: No Food Insecurity (6/8/2024)    Hunger Vital Sign     Worried About Running Out of Food in the Last Year: Never true     Ran Out of Food in the Last Year: Never true   Transportation Needs: No Transportation Needs (6/8/2024)    PRAPARE - Transportation     Lack of Transportation (Medical): No     Lack of Transportation (Non-Medical): No   Physical Activity: Sufficiently Active (6/8/2024)    Exercise Vital Sign     Days of Exercise per Week: 6 days     Minutes of Exercise per Session: 60 min   Stress: Stress Concern Present (6/8/2024)    [employer] - Occupational Stress Questionnaire     Feeling of Stress : To some extent   Social Connections: Socially Integrated (6/8/2024)    Social Connection and Isolation Panel [NHANES]     Frequency of Communication with Friends and Family: More than three times a week     Frequency of Social Gatherings with Friends and Family: Three times a week     Attends Tenri[employer] Services: More than 4 times per year     Active Member of Clubs or Organizations: Yes     Attends Club or Organization Meetings: More than 4 times per year     Marital Status:    Intimate Partner Violence: Not on file   Housing Stability: Low Risk  (6/8/2024)    Housing Stability Vital Sign     Unable to Pay for Housing in the Last Year: No     Number of Places Lived in the Last Year: 1     Unstable Housing in the Last Year: No       Allergies as of 12/04/2024    (No Known Allergies)          Current medications as of today   Current Outpatient Medications   Medication Sig Dispense Refill    fenofibrate  micronized (LOFIBRA) 134 MG capsule Take 1 Capsule by mouth at bedtime. 90 Capsule 0     No current facility-administered medications for this visit.         Physical Exam:   Gen:           Alert and oriented, No apparent distress. Mood and affect appropriate, normal interaction with examiner.   Hearing:     Grossly intact.  Nose:          Normal, no lesions or deformities.  Dentition:    Good dentition.   Oropharynx:   Tongue normal, posterior pharynx without erythema or exudate.  Mallampati Classification: III  Neck:        Supple, trachea midline, no masses.  Respiratory Effort: No intercostal retractions or use of accessory muscles.   Gait and Station: Normal.  Digits and Nails: No clubbing, cyanosis, petechiae, or nodes.   Skin:        No rashes, lesions or ulcers noted.               Ext:           No cyanosis or edema.      Immunizations:  Flu: Annual recommended  Moderna SARS CoV2 Vaccine: 12/20/2021, 5/5/2021, 4/7/2021    Assessment / Plan:  1. BABAK (obstructive sleep apnea)  - DME Mask and Supplies  - DME Other    Reviewed first compliance on new device, patient meeting all requirements.  She is demonstrating use and benefit.  Will send updated order for mask and supplies specifying nasal cushion.  She did not receive her usual mask which is the nasal cushion and does prefer this.  Sample provided in clinic.  Patient was reminded to use distilled water only in humidifier chamber with fresh fill daily.  We did review equipment cleaning as well as equipment replacement schedule.  Patient advised to follow-up in 1 year, sooner if needed.      Follow-up:   Return in about 1 year (around 12/4/2025) for Return with Adriana Arshad PA-C.    Please note that this dictation was created using voice recognition software. I have made every reasonable attempt to correct obvious errors, but it is possible there are errors of grammar and possibly content that I did not discover before finalizing the note.

## 2024-12-04 NOTE — PATIENT INSTRUCTIONS
1-reviewed first compliance on new device  2-meeting all requirements   3-demonstrating use and benefit  4-send updated order for mask and supplies specifying nasal cushion  5-did not receive usual mask which is nasal cushion  6-sample provided   7-As a reminder use distilled water only in humidifier chamber.  Fresh fill daily.  8-Today we reviewed equipment cleaning  once weekly minimum  mask, tubing and water chamber  use dedicated container  use mild soap and water  SoClean or other ozone  are not recommended  white vinegar and water solution is no longer recommended  hang tubing to dry  mask sanitizing wipes are an option for use   9-Equipment replacement schedule : Mask cushion every month, Head gear every 6 months, Tubing every 3 months, Ultra-fine filters 2 times per month, Humidifier chamber every 6 months  10-follow up in one year, sooner if needed

## 2025-01-24 ENCOUNTER — APPOINTMENT (OUTPATIENT)
Dept: RADIOLOGY | Facility: IMAGING CENTER | Age: 40
End: 2025-01-24
Payer: COMMERCIAL

## 2025-01-24 ENCOUNTER — OFFICE VISIT (OUTPATIENT)
Dept: URGENT CARE | Facility: CLINIC | Age: 40
End: 2025-01-24
Payer: COMMERCIAL

## 2025-01-24 VITALS
TEMPERATURE: 97.4 F | RESPIRATION RATE: 16 BRPM | BODY MASS INDEX: 25.83 KG/M2 | HEART RATE: 98 BPM | OXYGEN SATURATION: 99 % | WEIGHT: 155 LBS | HEIGHT: 65 IN | SYSTOLIC BLOOD PRESSURE: 104 MMHG | DIASTOLIC BLOOD PRESSURE: 72 MMHG

## 2025-01-24 DIAGNOSIS — S89.92XA SHIN INJURY, LEFT, INITIAL ENCOUNTER: ICD-10-CM

## 2025-01-24 DIAGNOSIS — Z71.1 WORRIED WELL: ICD-10-CM

## 2025-01-24 DIAGNOSIS — S86.899A MEDIAL TIBIAL STRESS SYNDROME, UNSPECIFIED LATERALITY, INITIAL ENCOUNTER: ICD-10-CM

## 2025-01-24 DIAGNOSIS — S89.91XA SHIN INJURY, RIGHT, INITIAL ENCOUNTER: ICD-10-CM

## 2025-01-24 PROCEDURE — 3078F DIAST BP <80 MM HG: CPT

## 2025-01-24 PROCEDURE — 73590 X-RAY EXAM OF LOWER LEG: CPT | Mod: TC,FY,LT | Performed by: RADIOLOGY

## 2025-01-24 PROCEDURE — 99214 OFFICE O/P EST MOD 30 MIN: CPT

## 2025-01-24 PROCEDURE — 73590 X-RAY EXAM OF LOWER LEG: CPT | Mod: TC,FY,RT | Performed by: RADIOLOGY

## 2025-01-24 PROCEDURE — 3074F SYST BP LT 130 MM HG: CPT

## 2025-01-24 RX ORDER — DEXTROAMPHETAMINE SACCHARATE, AMPHETAMINE ASPARTATE MONOHYDRATE, DEXTROAMPHETAMINE SULFATE AND AMPHETAMINE SULFATE 6.25; 6.25; 6.25; 6.25 MG/1; MG/1; MG/1; MG/1
CAPSULE, EXTENDED RELEASE ORAL
COMMUNITY
Start: 2025-01-19

## 2025-01-24 RX ORDER — DEXTROAMPHETAMINE SACCHARATE, AMPHETAMINE ASPARTATE, DEXTROAMPHETAMINE SULFATE AND AMPHETAMINE SULFATE 5; 5; 5; 5 MG/1; MG/1; MG/1; MG/1
TABLET ORAL
COMMUNITY
Start: 2025-01-19

## 2025-01-24 RX ORDER — DEXTROAMPHETAMINE SACCHARATE, AMPHETAMINE ASPARTATE, DEXTROAMPHETAMINE SULFATE AND AMPHETAMINE SULFATE 2.5; 2.5; 2.5; 2.5 MG/1; MG/1; MG/1; MG/1
TABLET ORAL
COMMUNITY
Start: 2024-10-28

## 2025-01-24 ASSESSMENT — ENCOUNTER SYMPTOMS
DIZZINESS: 0
SHORTNESS OF BREATH: 0
COUGH: 0
NAUSEA: 0
FEVER: 0
VOMITING: 0
LEG PAIN: 1
WEAKNESS: 0
DIARRHEA: 0

## 2025-01-24 ASSESSMENT — FIBROSIS 4 INDEX: FIB4 SCORE: 0.63

## 2025-01-24 NOTE — PROGRESS NOTES
"Subjective     Joyce Cintron is a 40 y.o. female who presents with Leg Pain (Weeks, Lower leg pain, both legs )            Joyce is here today with complaints of bilateral shin pain that she states initially started several months ago however is worsened over the past few weeks.  She notes that she tries to stay active and regularly walks, does HIT workouts, lifts weights, and occasionally runs.  She has tried reducing her activity as well as icing the area and taking Aleve for the pain.  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  She states she is wearing supportive shoes and wears Hoka's to work and other supportive footwear to walk and workout.  She states that although she thinks she might just have some shinsplints that she is very concerned that she might have a compression fracture and is requesting x-rays at this time.  She denies any edema, erythema, warmth, trauma or injury injury, shortness of breath, or previous history of clots.    Leg Pain  Pertinent negatives include no chest pain, congestion, coughing, fever, nausea, rash, vomiting or weakness.       Review of Systems   Constitutional:  Negative for fever and malaise/fatigue.   HENT:  Negative for congestion.    Respiratory:  Negative for cough and shortness of breath.    Cardiovascular:  Negative for chest pain.   Gastrointestinal:  Negative for diarrhea, nausea and vomiting.   Musculoskeletal:         Bilateral shin pain   Skin:  Negative for rash.   Neurological:  Negative for dizziness and weakness.   All other systems reviewed and are negative.             Objective     /72   Pulse 98   Temp 36.3 °C (97.4 °F) (Temporal)   Resp 16   Ht 1.651 m (5' 5\")   Wt 70.3 kg (155 lb)   SpO2 99%   BMI 25.79 kg/m²      Physical Exam  Vitals reviewed.   Constitutional:       Appearance: Normal appearance.   HENT:      Head: Normocephalic.      Nose: Nose normal.   Eyes: "      Extraocular Movements: Extraocular movements intact.      Conjunctiva/sclera: Conjunctivae normal.   Cardiovascular:      Rate and Rhythm: Normal rate.   Pulmonary:      Effort: Pulmonary effort is normal.   Musculoskeletal:         General: Normal range of motion.      Right lower leg: Tenderness present. No swelling, deformity, lacerations or bony tenderness. No edema.      Left lower leg: Tenderness present. No swelling, deformity, lacerations or bony tenderness. No edema.   Skin:     General: Skin is warm and dry.   Neurological:      Mental Status: She is alert and oriented to person, place, and time.   Psychiatric:         Behavior: Behavior normal.                             Assessment & Plan        Assessment & Plan  Shin injury, right, initial encounter    Orders:    DX-TIBIA AND FIBULA RIGHT; Future  HISTORY/REASON FOR EXAM:  Anterior tibial pain.     TECHNIQUE/EXAM DESCRIPTION AND NUMBER OF VIEWS:  2 views of the RIGHT tibia and fibula.     COMPARISON: None     FINDINGS:     BONE MINERALIZATION: Normal.  JOINTS: Preserved. No erosions.  FRACTURE: None.  DISLOCATION: None.  SOFT TISSUES: No mass.     IMPRESSION:     No acute osseous abnormality.  Shin injury, left, initial encounter    Orders:    DX-TIBIA AND FIBULA LEFT; Future  1/24/2025 1:46 PM     HISTORY/REASON FOR EXAM:  Anterior tibial pain.     TECHNIQUE/EXAM DESCRIPTION AND NUMBER OF VIEWS:  2 views of the LEFT tibia and fibula.     COMPARISON: None     FINDINGS:     BONE MINERALIZATION: Normal.  JOINTS: Preserved. No erosions.  FRACTURE: None.  DISLOCATION: None.  SOFT TISSUES: No mass.     IMPRESSION:     No acute osseous abnormality.  Worried well         Medial tibial stress syndrome, unspecified laterality, initial encounter       Discussed with Joyce that her bilateral tibia/fibula x-rays both came back with no signs of fracture or abnormalities.  Discussed that the pain that she is experiencing is most likely shinsplints.  Gave  her educational printout on shinsplints as well as rehab exercises.  Encouraged her to rest is much as possible or do activities that are low impact such as swimming or yoga.

## 2025-04-03 DIAGNOSIS — E78.5 DYSLIPIDEMIA: ICD-10-CM

## 2025-04-03 RX ORDER — FENOFIBRATE 134 MG/1
134 CAPSULE ORAL
Qty: 90 CAPSULE | Refills: 0 | Status: SHIPPED | OUTPATIENT
Start: 2025-04-03

## 2025-07-25 ENCOUNTER — APPOINTMENT (OUTPATIENT)
Dept: MEDICAL GROUP | Facility: MEDICAL CENTER | Age: 40
End: 2025-07-25
Payer: COMMERCIAL

## 2025-10-24 ENCOUNTER — APPOINTMENT (OUTPATIENT)
Dept: MEDICAL GROUP | Facility: MEDICAL CENTER | Age: 40
End: 2025-10-24
Payer: COMMERCIAL